# Patient Record
Sex: FEMALE | Race: WHITE | Employment: FULL TIME | URBAN - METROPOLITAN AREA
[De-identification: names, ages, dates, MRNs, and addresses within clinical notes are randomized per-mention and may not be internally consistent; named-entity substitution may affect disease eponyms.]

---

## 2017-11-22 ENCOUNTER — TELEPHONE (OUTPATIENT)
Dept: ORTHOPEDIC SURGERY | Age: 52
End: 2017-11-22

## 2017-11-27 NOTE — TELEPHONE ENCOUNTER
HOSPITAL CALLED INQUIRING IF A MRI COULD BE ORDERED BEFORE PATIENT SEEN ON 11/28/17. INFORMED THAT DUE TO AMOUNT OF TIME SINCE PATIENT WAS LAST SEEN , THIS COULD NOT BE DONE DUE TO INSURANCE GUIDELINES. OFFERED TO TAKE NUMBER AND HAVE PSS RETURN CALL.  HOSPITAL DECLINED

## 2017-11-28 ENCOUNTER — OFFICE VISIT (OUTPATIENT)
Dept: ORTHOPEDIC SURGERY | Age: 52
End: 2017-11-28

## 2017-11-28 VITALS
SYSTOLIC BLOOD PRESSURE: 118 MMHG | BODY MASS INDEX: 22.88 KG/M2 | WEIGHT: 134.04 LBS | HEIGHT: 64 IN | HEART RATE: 74 BPM | DIASTOLIC BLOOD PRESSURE: 76 MMHG

## 2017-11-28 DIAGNOSIS — M25.562 LEFT KNEE PAIN, UNSPECIFIED CHRONICITY: Primary | ICD-10-CM

## 2017-11-28 DIAGNOSIS — S80.02XA CONTUSION OF LEFT PATELLA, INITIAL ENCOUNTER: ICD-10-CM

## 2017-11-28 PROCEDURE — 73562 X-RAY EXAM OF KNEE 3: CPT | Performed by: ORTHOPAEDIC SURGERY

## 2017-11-28 PROCEDURE — MISC250 COMPRESSION STOCKING: Performed by: ORTHOPAEDIC SURGERY

## 2017-11-28 PROCEDURE — 99203 OFFICE O/P NEW LOW 30 MIN: CPT | Performed by: ORTHOPAEDIC SURGERY

## 2017-11-28 PROCEDURE — L1830 KO IMMOB CANVAS LONG PRE OTS: HCPCS | Performed by: ORTHOPAEDIC SURGERY

## 2017-11-28 RX ORDER — OXYCODONE HYDROCHLORIDE AND ACETAMINOPHEN 5; 325 MG/1; MG/1
1-2 TABLET ORAL EVERY 4 HOURS PRN
Qty: 20 TABLET | Refills: 0 | Status: SHIPPED | OUTPATIENT
Start: 2017-11-28 | End: 2018-11-28

## 2017-11-28 NOTE — LETTER
November 28, 2017        Re: Irene Guevara    Seen today for left knee injury. MRI pending. Will follow up once MRI is approved. 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         Board Certified Orthopaedic Surgeon  President and [address] [place] [address]

## 2017-11-28 NOTE — PROGRESS NOTES
Chief Complaint  Left Knee Pain    History  Patient is being seen after sustaining a knee giving way episode approximately 2 weeks ago. Patient was carrying a heavy box, approximately 100#, when her knee buckled a gave way. Patient does not recall a pop or crack, but noticed sever pain, anterior about the left knee. Patient presents today with use of crutches and limited weightbearing. Patient describes the patient as both sharp and throbbing pain that has not improved during this time. Patient notices elevated pain with any sort of bending or twisting motion. Of note, patient has an extensive surgical history of the right knee, having undergone PLM, Meniscus allograft and ultimately a partial TITA replacement was done. Patient states the pain she has today is worse than anything she had on the right. Pain Assessment  Location of Pain: Knee  Location Modifiers: Left  Severity of Pain: 10  Quality of Pain: Sharp, Aching, Throbbing  Duration of Pain: Persistent  Frequency of Pain: Constant  Aggravating Factors: Bending, Other (Comment) (twisting, and to wiggle her toes)  Limiting Behavior: Yes  Relieving Factors: Rest, Ice, Other (Comment) (medications)  Result of Injury: No  Work-Related Injury: No  Are there other pain locations you wish to document?: No     Physical Exam ______  Constitutional:  oriented to person, place, and time,  appears well-developed and well-nourished. ________________________________  Musculoskeletal:        Right hip: exhibits normal range of motion, normal strength, no tenderness, no bony tenderness, no swelling, no crepitus and no deformity. Left hip:  exhibits normal range of motion, normal strength, no tenderness, no swelling, no crepitus and no deformity. Right ankle: exhibits normal range of motion, no swelling, no ecchymosis and no deformity. No tenderness. Left ankle:  exhibits normal range of motion, no swelling, no ecchymosis and no deformity.  No tenderness. ________  Neurological:  is alert and oriented to person, place, and time. She has normal strength and normal reflexes. She displays a negative Romberg sign. Gait normal. ____  Skin: No ecchymosis, no laceration, no lesion and no rash noted. No erythema. ____  Psychiatric:  has a normal mood and affect. Her speech is normal. Cognition and memory are normal. __    Knee Examination:      RIGHT     LEFT  General:   EFFUSION:     [x] NL(4) [] Mild(3) [] Mod(2) [] Sev(1)   [x] NL(4) [] Mild(3) [] Mod(2) [] Sev(1)    TOTAL FLEX:  [x] NL(4) [] Mild(3) [] Mod(2) [] Sev(1)   [x] NL(4) [] Mild(3) [] Mod(2) [] Sev(1)    LACK of EXT:  [x] NL(4) [] Mild(3) [] Mod(2) [] Sev(1)   [x] NL(4) [] Mild(3) [] Mod(2) [] Sev(1)    QUAD WEAK: [x] NL(4) [] Mild(3) [] Mod(2) [] Sev(1)   [x] NL(4) [] Mild(3) [] Mod(2) [] Sev(1)    Points (16)            R:       L:        Tibio Femoral:       RIGHT     LEFT  JT LINE PAIN:     [x] NL(4) [] Mild(3) [] Mod(2) [] Sev(1)   [x] NL(4) [] Mild(3) [] Mod(2) [] Sev(1)    CREPITUS:  [x] NL(4) [] Mild(3) [] Mod(2) [] Sev(1)   [x] NL(4) [] Mild(3) [] Mod(2) [] Sev(1)    COMP PAIN:  [x] NL(4) [] Mild(3) [] Mod(2) [] Sev(1)   [x] NL(4) [] Mild(3) [] Mod(2) [] Sev(1)    Points (12)           R:      L:    Patello Femoral Joint:          RIGHT                  LEFT  CREPITUS:     [x] NL(4) [] Mild(3) [] Mod(2) [] Sev(1)   [x] NL(4) [] Mild(3) [] Mod(2) [] Sev(1)    COMP PAIN:  [x] NL(4) [] Mild(3) [] Mod(2) [] Sev(1)   [] NL(4) [] Mild(3) [] Mod(2) [x] Sev(1)    SOFT TISSUE PAIN:  [x] NL(4) []Mild(3) []Mod(2) [] Sev(1)  Location:    [x] NL(4) [] Mild(3) [] Mod(2) [] Sev(1)   Location:    SOFT TISSUE SWELLING:   [x]NL(4) []Mild(3) []Mod(2) [] Sev(1)   Location:     [x] NL(4) [] Mild(3) [] Mod(2) [] Sev(1)  Location:     Points: (16)          R:      L:         Patello Femoral Alignment:       RIGHT     LEFT  LAT.  SUBLUX at 20 Degrees (%width)   [x]0-25 (4) []26-50 (3) []51-75 (2) [] >75 (1) [x]0-25 (4) []26-50 (3) []51-75 (2) [] >75 (1)   MED. SUBLUX at 20 Degrees (mm)   [x]15 (4) []11-15 (3) []6-10 (2) [] 0-5 (1)    [x]15 (4) []11-15 (3) []6-10 (2) [] 0-5 (1)   Q Angle at 5 Degrees  [x]0-15 (4) []16-20 (3) []21-25 (2) [] >25 (1)   [x]0-15 (4) []16-20 (3) []21-25 (2) [] >25 (1)   Q Angle Max at 20 Degrees  [x]25 (4) []30 (3) []35 (2) [] >35 (1)   [x]25 (4) []30 (3) []35 (2) [] >35 (1)   Points: (16)           R:      L:    Ligament Test:   Test   Right Left  Difference Test  Right Left Difference   Ant 25 Degrees [x] Normal       mm      mm      mm Med 0 Degrees [x] Normal       mm      mm      mm   Ant 90 Degrees [x] Normal       mm      mm      mm Med 25 Degrees [x] Normal       mm      mm      mm   P.S. (0-3) [x] Normal         Lat 0 Degrees [x] Normal       mm      mm      mm   Post 25 Degrees [x] Normal       mm      mm       mm Lat 25 Degrees [x] Normal       mm      mm      mm   Post 90 Degrees [x] Normal       mm      mm      mm ER 25 Degrees [x] Normal       deg.      deg.      deg.    RPS (0-3) [x] Normal     ER 90 Degrees [x] Normal       deg.       deg.      deg. X-ray: 3 views. PA, Lateral and merchant views of the left. Possible fracture to the distal 1/3 of patella, however does not appear to be a true fracture of the patella   Right      Left:   Med Tibiofemoral:  [x] NL(4) []Mild(3) [] Mod (2) [] Sev (1)     [x] NL(4) []Mild(3) [] Mod (2) [] Sev (1)    Lat Tibiofemoral:  [x] NL(4) [] Mild(3) [] Mod(2) [] Sev(1)     [x] NL(4) [] Mild(3) [] Mod(2) [] Sev(1)    Patellofemoral:  [x] NL(4) [] Mild(3) [] Mod(2) [] Sev(1)  [x] NL(4) [] Mild(3) [] Mod(2) [] Sev(1)    Alignment:  [x] Normal          Degrees                WBL  [x] Normal                 Degrees                WBL   Point: (12)             R:      L:           Diagnosis:Patellar contusion    Treatment: Patient does not appear to have caused any significant trauma based on history, radiographs and examination. However, due to significant levels of pain exhibited by the patient, we will treat it as a fracture and have her sent for an MRI to rule out the presence of one or any other abnormality. We are also going to provide the patient with compression sleeves and a knee immobilizer and should continue to use her crutches and limited WB in the instance it is a fracture. We will follow up with the patient was the MRI is completed. Patient was in agreement with this. All questions answered. I Viky Egan, MS, ATC, am scribing for and in the presence of Dr. Mian Balderrama   This dictation was performed with a verbal recognition program [facility]) and it was checked for errors. It is possible that there are still dictated errors within this office note. If so, please bring any errors to my attention for an addendum. All efforts were made to ensure that this office note is accurate. Supervising Physician Attestation:  I, Dr. Ijeoma Mariano, personally performed the services described in this documentation as scribed above, and it is both accurate and complete and I agree with all pertinent clinical information. I personally interviewed the patient and performed a physical examination and medical decision making. I discussed the patient's condition and treatment options and have  reviewed and agree with the past medical, family and social history unless otherwise noted. All of the patient's questions were answered.       Board Certified Orthopaedic Surgeon  [address] [facility] and [address] [place] [address]  President and Medical Director  Samantha Joseph                12:33 PM

## 2017-11-30 ENCOUNTER — OFFICE VISIT (OUTPATIENT)
Dept: ORTHOPEDIC SURGERY | Age: 52
End: 2017-11-30

## 2017-11-30 VITALS
DIASTOLIC BLOOD PRESSURE: 81 MMHG | SYSTOLIC BLOOD PRESSURE: 112 MMHG | HEART RATE: 62 BPM | WEIGHT: 134 LBS | BODY MASS INDEX: 22.88 KG/M2 | HEIGHT: 64 IN

## 2017-11-30 DIAGNOSIS — S83.242D ACUTE MEDIAL MENISCUS TEAR OF LEFT KNEE, SUBSEQUENT ENCOUNTER: ICD-10-CM

## 2017-11-30 DIAGNOSIS — S83.512S NEW ACL TEAR, LEFT, SEQUELA: Primary | ICD-10-CM

## 2017-11-30 PROCEDURE — 99214 OFFICE O/P EST MOD 30 MIN: CPT | Performed by: ORTHOPAEDIC SURGERY

## 2017-11-30 NOTE — PROGRESS NOTES
Chief Complaint    Follow-up (left knee; MRI results)      History of Present Illness:  Pat Bain is a 50 y.o. female  Pain Assessment  Location of Pain: Knee  Location Modifiers: Left  Severity of Pain: 10  Quality of Pain: Sharp, Aching  Duration of Pain:  (weeks)  Aggravating Factors:  (moving)  Relieving Factors: Ice, Rest, Nsaids  Result of Injury: Yes  Work-Related Injury: No  Are there other pain locations you wish to document?: No     Patient is being seen for a follow up visit to discuss the results of her recent MRI for her right knee. Patient was at work when, carrying a very heavy box (approximately 100#) and experienced a giving way episode and onset of pain. Patient was diagnosed with a patellar contusion after radiographs couldn't confirm a fracture. However, she was sent for an MRI due to the amount of pain she was experiencing. Patient still has significant levels of pain with ranging her knee. When she went for her MRI, she came out of her immobilizer and stood on her left leg to get onto the MRI table and felt her knee was extremely unstable and almost fell, causing significant pain. Patient also expresses a burning sensation deep to her patella. Reviewing patient's history patient does recall hearing a pop about her knee. Medical History:  Patient's medications, allergies, past medical, surgical, social and family histories were reviewed and updated as appropriate. Allergies   Allergen Reactions    Sulfa Antibiotics Nausea And Vomiting     Current Outpatient Prescriptions on File Prior to Visit   Medication Sig Dispense Refill    oxyCODONE-acetaminophen (PERCOCET) 5-325 MG per tablet Take 1-2 tablets by mouth every 4 hours as needed for Pain .  20 tablet 0    b complex vitamins capsule Take 1 capsule by mouth daily      butalbital-acetaminophen-caffeine (FIORICET, ESGIC) -40 MG per tablet Take 1 tablet by mouth every 4 hours as needed for Headaches      Topiramate (TOPAMAX PO) Take 50 mg by mouth 3 times daily      ibuprofen (ADVIL;MOTRIN) 600 MG tablet Take 1 tablet by mouth every 6 hours as needed for Pain 30 tablet 0    ondansetron (ZOFRAN ODT) 4 MG disintegrating tablet Take 1 tablet by mouth every 8 hours as needed for Nausea 20 tablet 0    FLUoxetine (PROZAC) 40 MG capsule Take 40 mg by mouth daily.  cetirizine (ZYRTEC) 10 MG tablet Take 10 mg by mouth daily.  multivitamin (THERAGRAN) per tablet Take 1 tablet by mouth daily. No current facility-administered medications on file prior to visit. Review of Systems:  Relevant review of systems reviewed and available in the patient's chart    Vital Signs:  Vitals:    11/30/17 1505   BP: 112/81   Pulse: 62         Physical Exam ______  Constitutional:  oriented to person, place, and time,  appears well-developed and well-nourished. ________________________________  Musculoskeletal:        Right hip: exhibits normal range of motion, normal strength, no tenderness, no bony tenderness, no swelling, no crepitus and no deformity. Left hip:  exhibits normal range of motion, normal strength, no tenderness, no swelling, no crepitus and no deformity. Right ankle: exhibits normal range of motion, no swelling, no ecchymosis and no deformity. No tenderness. Left ankle:  exhibits normal range of motion, no swelling, no ecchymosis and no deformity. No tenderness. ________  Neurological:  is alert and oriented to person, place, and time. She has normal strength and normal reflexes. She displays a negative Romberg sign. Gait normal. ____  Skin: No ecchymosis, no laceration, no lesion and no rash noted. No erythema. ____  Psychiatric:  has a normal mood and affect.  Her speech is normal. Cognition and memory are normal. __    Knee Examination: Left Knee    Inspection:  Mild joint effusion, no obvious deformity    Palpation:  Moderate tenderness MJL, moderate-sever tenderness anterior    Range

## 2017-12-01 RX ORDER — OXYCODONE HYDROCHLORIDE AND ACETAMINOPHEN 5; 325 MG/1; MG/1
1-2 TABLET ORAL EVERY 4 HOURS PRN
Qty: 40 TABLET | Refills: 0 | Status: SHIPPED | OUTPATIENT
Start: 2017-12-01 | End: 2018-01-11

## 2017-12-06 ENCOUNTER — HOSPITAL ENCOUNTER (OUTPATIENT)
Dept: PHYSICAL THERAPY | Age: 52
Discharge: HOME | End: 2017-12-31
Admitting: ORTHOPAEDIC SURGERY

## 2017-12-06 DIAGNOSIS — S83.512S SPRAIN OF ANTERIOR CRUCIATE LIGAMENT OF LEFT KNEE, SEQUELA: Primary | ICD-10-CM

## 2017-12-06 NOTE — PLAN OF CARE
consistent with pathology which may benefit from Dry needling      []other:      Prognosis/Rehab Potential:      []Excellent   [x]Good    []Fair   []Poor    Tolerance of evaluation/treatment:    []Excellent   [x]Good    []Fair   []Poor    Physical Therapy Evaluation Complexity Justification  [x] A history of present problem with:  [] no personal factors and/or comorbidities that impact the plan of care;  [x]1-2 personal factors and/or comorbidities that impact the plan of care  []3 personal factors and/or comorbidities that impact the plan of care  [x] An examination of body systems using standardized tests and measures addressing any of the following: body structures and functions (impairments), activity limitations, and/or participation restrictions;:  [] a total of 1-2 or more elements   [] a total of 3 or more elements   [x] a total of 4 or more elements   [x] A clinical presentation with:  [] stable and/or uncomplicated characteristics   [x] evolving clinical presentation with changing characteristics  [] unstable and unpredictable characteristics;   [x] Clinical decision making of [] low, [x] moderate, [] high complexity using standardized patient assessment instrument and/or measurable assessment of functional outcome. [] EVAL (LOW) 70666 (typically 20 minutes face-to-face)  [x] EVAL (MOD) 36709 (typically 30 minutes face-to-face)  [] EVAL (HIGH) 27105 (typically 45 minutes face-to-face)  [] RE-EVAL       PLAN  Frequency/Duration:  1 day per week until surgery and then 2 days per week for 12 Weeks:  Interventions:  [x]  Therapeutic exercise including: strength training, ROM, for Lower extremity and core   [x]  NMR activation and proprioception for LE, Glutes and Core   [x]  Manual therapy as indicated for LE, Hip and spine to include: Dry Needling/IASTM, STM, PROM, Gr I-IV mobilizations, manipulation.    [x] Modalities as needed that may include: thermal agents, E-stim, Biofeedback, US, iontophoresis as indicated  [x] Patient education on joint protection, postural re-education, activity modification, progression of HEP. HEP instruction: Patient instructed on HEP on this date with handout provided and all questions answered. Patient was instructed to contact PT with any complications or questions about HEP moving forward. Patient verbally stated she understood. GOALS:  Patient stated goal: Return to walking 1 mile at [employer]    Therapist goals for Patient:   Short Term Goals: To be achieved in: 4 weeks  1. Independent in HEP and progression per patient tolerance, in order to prevent re-injury. 2. Patient will have a decrease in pain to <2/10 at max to facilitate improvement in movement, function, and ADLs as indicated by Functional Deficits. 3. Patient will maintain L Knee AROM 0-135 prior to surgery. Long Term Goals: To be achieved in: 12 weeks post-operatively  1. Disability index score of 40% or less for the LEFS to assist with reaching prior level of function. 2. Patient will demonstrate increased AROM to 0-135 to allow for proper joint functioning as indicated by patients Functional Deficits. 3. Patient will demonstrate an increase in Strength to 4/5 or greater in BLE to allow for proper functional mobility as indicated by patients Functional Deficits. 4. Patient will be able to walk 1 mile without increase in pain or swelling. (patient specific functional goal)  5.  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

## 2017-12-06 NOTE — FLOWSHEET NOTE
[facility]  Orthopaedics and Sports Rehabilitation, [facility]    Physical Therapy Daily Treatment Note  Date:  2017    Patient Name:  Fabian Gonzalez    :  1965  MRN: [id number]  Restrictions/Precautions:  None  Medical/Treatment Diagnosis Information:  · Diagnosis: Q44.659J (ICD-10-CM) - Sprain of anterior cruciate ligament of left knee, sequela  · Treatment Diagnosis: Decreased ROM, decreased quad activation, increased swelling, impaired gait mechanics  Insurance/Certification information:  PT Insurance Information: /PT BENEFITS 2017/FACILITY/ [employer]/ EFFECTIVE/ ACTIVE 16/ DED 5000/OOP 6350/ COPAY 0/ PAYS 80%/ 20 [facility]Y/ REF #[id number]/ 17 CB/ COLLECT 50 FOR EVAL AND 25 FOR FOLLOW UP VISITS  Physician Information:  Referring Practitioner: Soraida Brown of care signed (Y/N): Pending    Date of Patient follow up with Physician: 17    G-Code (if applicable):      Date G-Code Applied:  17  PT G-Codes  Functional Assessment Tool Used: LEFS  Score: 97% deficit  Functional Limitation: Mobility: Walking and moving around  Mobility: Walking and Moving Around Current Status (): At least 80 percent but less than 100 percent impaired, limited or restricted  Mobility: Walking and Moving Around Goal Status ():  At least 1 percent but less than 20 percent impaired, limited or restricted    Progress Note: [x]  Yes  []  No  Next due by: Visit #10       Latex Allergy:  [x]NO      []YES  Preferred Language for Healthcare:   [x]English       []other:    Visit # Insurance Allowable   1 20 VPCY     Pain level:  0/10 rest, 9/10 worst     SUBJECTIVE:  See eval    Imaging Impression (via MD note):   Left ACL rupture  Acute, red-white tear of the medial meniscus  Bony contusion of tibial plateau   Mild MCL sprain  Neuritis    OBJECTIVE:       ROM PROM AROM Overpressure Comment     L R L R L R     Flexion     135 142         Extension     +5 +5                                                 Strength L R Comment   Quad 3+/5 5/5 Quad Set   Hamstring         Gastroc         Hip  flexion         Hip abd                                   Special Test Results/Comment   Meniscal Click Neg   Crepitus Neg   Flexion Test Neg   Valgus Laxity Mild on L   Varus Laxity Neg   Lachmans Pos on R   Drop Back Neg   Homans Neg               Girth L R   Mid Patella 34.4 34.5   Suprapatellar 34.4 34.5   5cm above 35.8 35.3   15cm above          Reflexes/Sensation:               [x]Dermatomes/Myotomes intact               [x]Reflexes equal and normal bilaterally              []Other:     Joint mobility:                [x]Normal               []Hypo              []Hyper     Palpation: Has tenderness over aspen-lateral joint line (moderate); denies any other tenderness to palpation     Functional Mobility/Transfers: Requires increased time to raise/lower L LE onto table     Posture: Guarded posture in long sitting with visible muscle tremors at times during evaluation.     Bandages/Dressings/Incisions: n/a     Gait: (include devices/WB status) Ambulates without weight on LLE with knee in KI using bilateral axillary crutches     Orthopedic Special Tests: see above          RESTRICTIONS/PRECAUTIONS: L Knee ACL Tear + Medial Meniscus Tear + MCL Sprain    Exercises/Interventions:   Exercise/Equipment Resistance/Repetitions Other comments   Stretching     Hamstring 3 x 30\"    Towel Pull 3 x 30\"    Inclined Calf     Hip Flexion     ITB     Groin     Quad                    SLR     Supine 3 x 10    Abduction     Adduction     Prone     SLR+          Isometrics     Quad sets 10 x 10\"         Patellar Glides     Medial     Superior     Inferior          ROM     Sheet Pulls Demo for HEP    Hang Weights     Passive 5 x 30\" EOB Self-assist   Active     Weight Shift     Ankle Pumps                    CKC     Calf raises     Wall sits     Step ups     1 leg stand     Squatting     CC TKE     Balance     bridges          PRE Extension  RANGE:   Flexion  RANGE:        Quantum machines     Leg press      Leg extension     Leg curl          Manual interventions                 Patient Education:   12/6/17: Patient educated about PT diagnosis, prognosis, and plan of care; educated on role of physical therapy; educated on HEP; educated on anatomy of ACL and mensicus; on activities to avoid; on importance of maintenance of ROM pre-operatively. HEP:Patient instructed on HEP on this date with handout provided and all questions answered. Patient was instructed to contact PT with any complications or questions about HEP moving forward. Patient verbally stated she understood. Updated 12/6/17    Therapeutic Exercise and NMR EXR  [x] (99276) Provided verbal/tactile cueing for activities related to strengthening, flexibility, endurance, ROM for improvements in LE, proximal hip, and core control with self care, mobility, lifting, ambulation.  [] (39335) Provided verbal/tactile cueing for activities related to improving balance, coordination, kinesthetic sense, posture, motor skill, proprioception  to assist with LE, proximal hip, and core control in self care, mobility, lifting, ambulation and eccentric single leg control.      NMR and Therapeutic Activities:    [x] (10217 or 41487) Provided verbal/tactile cueing for activities related to improving balance, coordination, kinesthetic sense, posture, motor skill, proprioception and motor activation to allow for proper function of core, proximal hip and LE with self care and ADLs  [] (07311) Gait Re-education- Provided training and instruction to the patient for proper LE, core and proximal hip recruitment and positioning and eccentric body weight control with ambulation re-education including up and down stairs     Home Exercise Program:    [x] (87993) Reviewed/Progressed HEP activities related to strengthening, flexibility, endurance, ROM of core, proximal hip and LE for functional self-care, proper joint functioning as indicated by patients Functional Deficits. 3. Patient will demonstrate an increase in Strength to 4/5 or greater in BLE to allow for proper functional mobility as indicated by patients Functional Deficits. 4. Patient will be able to walk 1 mile without increase in pain or swelling. (patient specific functional goal)  5. 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 [] Progression is slowed due to complexities listed. [] Progression has been slowed due to co-morbidities.   [x] Plan just implemented, too soon to assess goals progression  [] Other:     ASSESSMENT:  See eval    Treatment/Activity Tolerance:  [x] Patient tolerated treatment well [] Patient limited by fatique  [] Patient limited by pain  [] Patient limited by other medical complications  [] Other:     Prognosis: [x] Good [] Fair  [] Poor    Patient Requires Follow-up: [x] Yes  [] No    PLAN: See eval  [] Continue per plan of care [] Alter current plan (see comments)  [x] Plan of care initiated [] Hold pending MD visit [] Discharge    Electronically signed by: Asher Concepcion PT, DPT

## 2017-12-14 ENCOUNTER — OFFICE VISIT (OUTPATIENT)
Dept: ORTHOPEDIC SURGERY | Age: 52
End: 2017-12-14

## 2017-12-14 ENCOUNTER — HOSPITAL ENCOUNTER (OUTPATIENT)
Dept: PHYSICAL THERAPY | Age: 52
Discharge: HOME | End: 2017-12-14
Admitting: ORTHOPAEDIC SURGERY

## 2017-12-14 VITALS
HEART RATE: 68 BPM | HEIGHT: 64 IN | SYSTOLIC BLOOD PRESSURE: 114 MMHG | BODY MASS INDEX: 22.88 KG/M2 | DIASTOLIC BLOOD PRESSURE: 81 MMHG | WEIGHT: 134.04 LBS

## 2017-12-14 DIAGNOSIS — S83.512A NEW ACL TEAR, LEFT, INITIAL ENCOUNTER: Primary | ICD-10-CM

## 2017-12-14 PROCEDURE — 99213 OFFICE O/P EST LOW 20 MIN: CPT | Performed by: ORTHOPAEDIC SURGERY

## 2017-12-14 RX ORDER — OXYCODONE HYDROCHLORIDE AND ACETAMINOPHEN 5; 325 MG/1; MG/1
1-2 TABLET ORAL EVERY 6 HOURS PRN
Qty: 40 TABLET | Refills: 0 | Status: SHIPPED | OUTPATIENT
Start: 2017-12-14 | End: 2018-01-11

## 2017-12-14 NOTE — PROGRESS NOTES
(PERCOCET) 5-325 MG per tablet Take 1-2 tablets by mouth every 4 hours as needed for Pain  1-2 tablets every 3-4 hours when necessary pain. 40 tablet 0    oxyCODONE-acetaminophen (PERCOCET) 5-325 MG per tablet Take 1-2 tablets by mouth every 4 hours as needed for Pain . 20 tablet 0    b complex vitamins capsule Take 1 capsule by mouth daily      butalbital-acetaminophen-caffeine (FIORICET, ESGIC) -40 MG per tablet Take 1 tablet by mouth every 4 hours as needed for Headaches      Topiramate (TOPAMAX PO) Take 50 mg by mouth 3 times daily      ibuprofen (ADVIL;MOTRIN) 600 MG tablet Take 1 tablet by mouth every 6 hours as needed for Pain 30 tablet 0    ondansetron (ZOFRAN ODT) 4 MG disintegrating tablet Take 1 tablet by mouth every 8 hours as needed for Nausea 20 tablet 0    FLUoxetine (PROZAC) 40 MG capsule Take 40 mg by mouth daily.  cetirizine (ZYRTEC) 10 MG tablet Take 10 mg by mouth daily.  multivitamin (THERAGRAN) per tablet Take 1 tablet by mouth daily. No current facility-administered medications for this visit. Allergies   Allergen Reactions    Sulfa Antibiotics Nausea And Vomiting       Review of Systems:  A 14 point review of systems was completed by the patient on 12/14/2017 and is available in the media section of the scanned medical record and was reviewed on 12/14/2017. The review is negative with the exception of those things mentioned in the HPI and Past Medical History     Vital Signs:   /81   Pulse 68   Ht 5' 4.02\" (1.626 m)   Wt 134 lb 0.6 oz (60.8 kg)   BMI 23.00 kg/m²     General Exam:   Mental Status: The patient is oriented to time, place and person. The patient's mood and affect are appropriate. Neurological: The patient has good coordination. There is no weakness or sensory deficit. Knee Examination:left    Skin:  There are no skin lesions, cellulitis, or extreme edema in the lower extremities.  Sensation is grossly intact to light touch discussion of ACL surgery and possible necessity to perform added surgery based on the operative findings and surgeons judgement including meniscus surgery or repair, cartilage restoration procedures, patella procedures and other ligament procedures. The knee ACL description injury information sheet was provided including cartilage and meniscus injury, success rates, complications, rehab, failure rates, graft choices, PO graft site harvest pain or scar, and PO expectations. The RBA explained including but not limited to pain, swelling, infection, blood clots, atrophy, scar tissue, fibrosis, preexisting damage aggravation, portal pain or scar, bleeding, and need for patient compliance with extensive rehab program. Further info sites mentioned included the Website and Ebook. All questions answered, informed consent obtained. PO instruction sheet reviewed including skin prep and DVT program.    Follow up in: pre surgical discussion. 11:40 AM      Adam Bobby PA-C  Orthopaedic Sports Medicine Physician Assistant    During this examination, IAdam PA-C, functioned as a scribe for Dr. Barbie Guy. This dictation was performed with a verbal recognition program (DRAGON) and it was checked for errors. It is possible that there are still dictated errors within this office note. If so, please bring any errors to my attention for an addendum. All efforts were made to ensure that this office note is accurate. Supervising Physician Attestation:  I, Dr. Barbie Guy, personally performed the services described in this documentation as scribed above, and it is both accurate and complete and I agree with all pertinent clinical information. I personally interviewed the patient and performed a physical examination and medical decision making. I discussed the patient's condition and treatment options and have  reviewed and agree with the past medical, family and social history unless otherwise noted.  All

## 2017-12-14 NOTE — FLOWSHEET NOTE
Flexion Test Neg   Valgus Laxity Mild on L   Varus Laxity Neg   Lachmans Pos on R   Drop Back Neg   Homans Neg               Girth L R   Mid Patella 34.4 34.5   Suprapatellar 34.4 34.5   5cm above 35.8 35.3   15cm above          Reflexes/Sensation:               [x]Dermatomes/Myotomes intact               [x]Reflexes equal and normal bilaterally              []Other:     Joint mobility:                [x]Normal               []Hypo              []Hyper     Palpation: Has tenderness over aspen-lateral joint line (moderate); denies any other tenderness to palpation     Functional Mobility/Transfers: Requires increased time to raise/lower L LE onto table     Posture: Guarded posture in long sitting with visible muscle tremors at times during evaluation.     Bandages/Dressings/Incisions: n/a     Gait: (include devices/WB status) Ambulates without weight on LLE with knee in KI using bilateral axillary crutches     Orthopedic Special Tests: see above          RESTRICTIONS/PRECAUTIONS: L Knee ACL Tear + Medial Meniscus Tear + MCL Sprain    Exercises/Interventions:   Exercise/Equipment Resistance/Repetitions Other comments   Stretching     Hamstring 5 x 30\" Prop   Towel Pull 5 x 30\" Prop   Inclined Calf     Hip Flexion     ITB     Groin     Quad                    SLR     Supine 3 x 10 2# above knee   Abduction 3 x 10 2# Above Knee   Adduction     Prone 3 x 10 2# above knee   SLR+          Isometrics     Quad sets 10 x 10\"         Patellar Glides     Medial     Superior     Inferior          ROM     Sheet Pulls 5 x 30\"    Hang Weights     Active     Weight Shift 10 x 10\" Biodex %WB: up to 75%   Ankle Pumps                    CKC     Calf raises     Wall sits 3 x fatigue    Step ups     1 leg stand     Squatting     CC TKE     Balance     bridges          PRE     Extension  RANGE:   Flexion  RANGE:        Quantum machines     Leg press      Leg extension     Leg curl          Therapeutic Activity     Gait Training 5' [employer] walking, bilat crutches             Manual interventions                 Patient Education:   12/6/17: Patient educated about PT diagnosis, prognosis, and plan of care; educated on role of physical therapy; educated on HEP; educated on anatomy of ACL and mensicus; on activities to avoid; on importance of maintenance of ROM pre-operatively. HEP:Patient instructed on HEP on this date with handout provided and all questions answered. Patient was instructed to contact PT with any complications or questions about HEP moving forward. Patient verbally stated she understood. Updated 12/14/17    Therapeutic Exercise and NMR EXR  [x] (29401) Provided verbal/tactile cueing for activities related to strengthening, flexibility, endurance, ROM for improvements in LE, proximal hip, and core control with self care, mobility, lifting, ambulation.  [] (73675) Provided verbal/tactile cueing for activities related to improving balance, coordination, kinesthetic sense, posture, motor skill, proprioception  to assist with LE, proximal hip, and core control in self care, mobility, lifting, ambulation and eccentric single leg control.      NMR and Therapeutic Activities:    [x] (01523 or 09156) Provided verbal/tactile cueing for activities related to improving balance, coordination, kinesthetic sense, posture, motor skill, proprioception and motor activation to allow for proper function of core, proximal hip and LE with self care and ADLs  [] (93639) Gait Re-education- Provided training and instruction to the patient for proper LE, core and proximal hip recruitment and positioning and eccentric body weight control with ambulation re-education including up and down stairs     Home Exercise Program:    [x] (74405) Reviewed/Progressed HEP activities related to strengthening, flexibility, endurance, ROM of core, proximal hip and LE for functional self-care, mobility, lifting and ambulation/stair navigation   [] (22327)Reviewed/Progressed HEP activities related to improving balance, coordination, kinesthetic sense, posture, motor skill, proprioception of core, proximal hip and LE for self care, mobility, lifting, and ambulation/stair navigation      Manual Treatments:  PROM / STM / Oscillations-Mobs:  G-I, II, III, IV (PA's, Inf., Post.)  [] (63240) Provided manual therapy to mobilize LE, proximal hip and/or LS spine soft tissue/joints for the purpose of modulating pain, promoting relaxation,  increasing ROM, reducing/eliminating soft tissue swelling/inflammation/restriction, improving soft tissue extensibility and allowing for proper ROM for normal function with self care, mobility, lifting and ambulation. Modalities: Ice to go    Charges:  Timed Code Treatment Minutes: 60   Total Treatment Minutes: 60       [] EVAL (LOW) 92243 (typically 20 minutes face-to-face)  [] EVAL (MOD) 62888 (typically 30 minutes face-to-face)  [] EVAL (HIGH) 62766 (typically 45 minutes face-to-face)  [] RE-EVAL   [x] KN(30683) x  2   [] IONTO  [x] NMR (52066) x  1   [] VASO  [] Manual (46750) x       [] Other:  [x] TA x  1    [] Mech Traction (85625)  [] ES(attended) (69999)      [] ES (un) (51155):     GOALS:  Patient stated goal: Return to walking 1 mile at [employer] for fitness     Therapist goals for Patient:   Short Term Goals: To be achieved in: 4 weeks  1. Independent in HEP and progression per patient tolerance, in order to prevent re-injury. 2. Patient will have a decrease in pain to <2/10 at max to facilitate improvement in movement, function, and ADLs as indicated by Functional Deficits. 3. Patient will maintain L Knee AROM 0-135 prior to surgery.     Long Term Goals: To be achieved in: 12 weeks post-operatively  1. Disability index score of 40% or less for the LEFS to assist with reaching prior level of function. 2. Patient will demonstrate increased AROM to 0-135 to allow for proper joint functioning as indicated by patients Functional Deficits.    3. Patient will demonstrate an increase in Strength to 4/5 or greater in BLE to allow for proper functional mobility as indicated by patients Functional Deficits. 4. Patient will be able to walk 1 mile without increase in pain or swelling. (patient specific functional goal)  5. 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 [] Progression is slowed due to complexities listed. [] Progression has been slowed due to co-morbidities. [x] Plan just implemented, too soon to assess goals progression  [] Other:     ASSESSMENT:  See eval    Treatment/Activity Tolerance:  [x] Patient tolerated treatment well [] Patient limited by fatique  [] Patient limited by pain  [] Patient limited by other medical complications  [x] Other: Tolerated therapy well today. Has maintained her ROM since PT eval last week. Her quad continues to show good activation but does fatigue significantly quicker on her L leg than her R leg. PT demonstrated to patient how to perform wall sits safely at home to increase her quad strength prior to surgery in January. Patient also educated on proper gait with crutches and how to safely WB through her L LE without buckling in her knee. Patient to see PT 1x/week leading up to surgery to monitor ROM, gait, and strength. Patient agreeable to this plan.     Prognosis: [x] Good [] Fair  [] Poor    Patient Requires Follow-up: [x] Yes  [] No    PLAN: See eval  [] Continue per plan of care [] Alter current plan (see comments)  [x] Plan of care initiated [] Hold pending MD visit [] Discharge    Electronically signed by: Lara Craft PT, DPT

## 2017-12-20 DIAGNOSIS — S83.512A SPRAIN OF ANTERIOR CRUCIATE LIGAMENT OF LEFT KNEE, INITIAL ENCOUNTER: Primary | ICD-10-CM

## 2017-12-22 ENCOUNTER — TELEPHONE (OUTPATIENT)
Dept: ORTHOPEDIC SURGERY | Age: 52
End: 2017-12-22

## 2018-01-01 ENCOUNTER — HOSPITAL ENCOUNTER (OUTPATIENT)
Dept: PHYSICAL THERAPY | Age: 53
Discharge: HOME | End: 2018-01-31
Attending: ORTHOPAEDIC SURGERY | Admitting: ORTHOPAEDIC SURGERY

## 2018-01-05 ENCOUNTER — TELEPHONE (OUTPATIENT)
Dept: ORTHOPEDIC SURGERY | Age: 53
End: 2018-01-05

## 2018-01-09 NOTE — PROGRESS NOTES
[facility] / [facility] ([facility]) Cheryl Bullard, [address]    Acknowledgment of Informed Consent for Surgical or Medical Procedure and Sedation  I agree to allow doctor(s) [address] [place] [address] and his/her associates or assistants, including residents and/or other qualified medical practitioner to perform the following medical treatment or procedure and to administer or direct the administration of sedation as necessary:  Procedure(s): LEFT KNEE ARTHROSCOPIC ANTERIOR CRUCIATE LIGAMENT RECONSTRUCTION SEMI TENDINOSIS GRACILIS VERSUS BONE TENDON BONE AUTOGRAFT MEDIAL [address] [facility]  My doctor has explained the following regarding the proposed procedure:   the explanation of the procedure   the benefits of the procedure   the potential problems that might occur during recuperation   the risks and side effects of the procedure which could include but are not limited to severe blood loss, infection, stroke or death   the benefits, risks and side effect of alternative procedures including the consequences of declining this procedure or any alternative procedures   the likelihood of achieving satisfactory results. I acknowledge no guarantee or assurance has been made to me regarding the results. I understand that during the course of this treatment/procedure, unforeseen conditions can occur which require an additional or different procedure. I agree to allow my physician or assistants to perform such extension of the original procedure as they may find necessary. I understand that sedation will often result in temporary impairment of memory and fine motor skills and that sedation can occasionally progress to a state of deep sedation or general anesthesia.     I understand the risks of anesthesia for surgery include, but are not limited to, sore throat, hoarseness, injury to face, mouth, or teeth; nausea; headache; injury to blood vessels or nerves; death, brain damage, or

## 2018-01-11 ENCOUNTER — HOSPITAL ENCOUNTER (OUTPATIENT)
Dept: PREADMISSION TESTING | Age: 53
Discharge: HOME | End: 2018-01-11
Attending: ORTHOPAEDIC SURGERY | Admitting: ORTHOPAEDIC SURGERY

## 2018-01-11 VITALS — HEIGHT: 64 IN | BODY MASS INDEX: 22.88 KG/M2 | WEIGHT: 134 LBS

## 2018-01-11 ASSESSMENT — PAIN - FUNCTIONAL ASSESSMENT: PAIN_FUNCTIONAL_ASSESSMENT: 0-10

## 2018-01-11 NOTE — PROGRESS NOTES
if you have not been preregistered yet. On the day of your procedure bring your insurance card and photo ID. You will be registered at your bedside once brought back to your room. 5. DO NOT EAT OR DRINK ANYTHING AFTER MIDNIGHT. 6. MEDICATIONS    Take the following medications with a SMALL sip of water:  topamax prozac    Use your usual dose of inhalers the morning of surgery. BRING your rescue inhaler with you to hospital.    Anesthesia does NOT want you to take insulin the morning of surgery. They will control your blood sugar while you are at the hospital. Please contact your ordering physician for instructions regarding your insulin the night before your procedure. If you have an insulin pump, please keep it set on basal rate. 7. Do not swallow water when brushing teeth. No gum, candy, mints or ice chips. Refrain from smoking or at least decrease the amount. 8. Dress in loose, comfortable clothing appropriate for redressing after your procedure. Do not wear jewelry (including body piercings), make-up (especially NO eye make-up), fingernail polish (NO toenail polish if foot/leg surgery), lotion, powders or metal hairclips. 9. Dentures, glasses, or contacts will need to be removed before your procedure. Bring cases for your glasses, contacts, dentures, or hearing aids to protect them while you are in surgery. 10. If you use a CPAP, please bring it with you on the day of your procedure. 11. We recommend that valuable personal  belongings, such as credit cards, cash, cell phones, e-tablets or jewelry, be left at home during your stay. The hospital will not be responsible for valuables that are not secured in the hospital safe. However, if your insurance requires a co-pay, you may want to bring a method of payment, i.e. Check or credit card, if you wish to pay your co-pay the day of surgery. 12. If you are to stay overnight, you may bring a bag with personal items.  Please have any large items you may need brought in by your family after your arrival to your hospital room. 15. If you have a Living Will or Durable Power of , please bring a copy on the day of your procedure. 15. With your permission, one family member may accompany you while you are being prepared for surgery. Once you are ready, additional family members may join you. HOW WE KEEP YOU SAFE and WORK TO PREVENT SURGICAL SITE INFECTIONS:  1. Health care workers should always check your ID bracelet to verify your name and birth date. You will be asked many times to state your name, date of birth, and allergies. 2. Health care workers should always clean their hands with soap or alcohol gel before providing care to you. It is okay to ask anyone if they cleaned their hands before they touch you. 3. You will be actively involved in verifying the type of procedure you are having and ensuring the correct surgical site. This will be confirmed multiple times prior to your procedure. Do NOT rosana your surgery site UNLESS instructed to by your surgeon. 4. Do not shave or wax for 72 hours prior to procedure near your operative site. Shaving with a razor can irritate your skin and make it easier to develop an infection. On the day of your procedure, any hair that needs to be removed near the surgical site will be clipped by a healthcare worker using a special clippers designed to avoid skin irritation. 5. When you are in the operating room, your surgical site will be cleansed with a special soap, and in most cases, you will be given an antibiotic before the surgery begins. AFTER YOUR PROCEDURE:  1. For comfort and safety, arrange to have someone at home with you for the first 24 hours after discharge. 2. You and your family will be given written instructions about your diet, activity, dressing care, medications, and return visits. 3. Always clean your hands before and after caring for your wound.  Do not let your family touch your surgery site without cleaning their hands. 4. Mild nausea, headache, muscle aches, sore throat, or fatigue may occur after anesthesia. Should any of these symptoms become severe, or should you notice any signs of infection, you should call your surgeon. 5. Narcotic pain medications can cause significant constipation. You may want to add a stool softener to your postoperative medication schedule or speak to your surgeon on how best to manage this SIDE EFFECT. SPECIAL INSTRUCTIONS  Thank you for allowing us to care for you. We strive to exceed your expectations in the delivery of care and service provided to you and your family. If you need to contact us for any reason, please call us at [phone]    Instructions reviewed with patient during preadmission testing phone interview. Lyn Barragan. 1/11/2018 .9:36 AM      ADDITIONAL EDUCATIONAL INFORMATION REVIEWED PER PHONE WITH YOU AND/OR YOUR FAMILY:  Yes Bring a urine sample on day of surgery  Yes Pain Goal-Taking Control of Your Pain  Yes FAQs about Surgical Site Infections  Yes Hibiclens® Bathing Instructions / or Other Antibacterial Soap  No Incentive Spirometer Education  No Other

## 2018-01-11 NOTE — PROGRESS NOTES
The following educational items and goals will be achieved upon completion of the patient's Pre-admission testing experience:             Identify the learner who is being assessed for education:  patient                    Ability to Learn:  Exhibits ability to grasp concepts and respond to questions: High  Ready to Learn: Yes  anxious   Preferred Method of Learning:  verbal  Barriers to Learning: Verbalizes interest  Special Considerations due to cultural, Samaritan, spiritual beliefs:  Yes  Language:  English  :  Yes    [address]   [x] Appropriate evaluation / integration of data as delineated by ASPAN Standards of Perianesthesia Nursing Practice    Pain scale and pain management   [x]Patient verbalizes understanding of pain scale and pain management  [x]Pre-operative determination of patients anticipated Post-Operative pain goal:   4 of 10 on 10 point scale post op goal  [] Other     Medication(s) - Compliance with preop medication instructions  [x] Patient verbalizes understanding of preop medications (see [facility] Presurgical Instructions)    Instructions, Pre op                                                                                            [x] Patient verbalizes understanding of presurgical instructions as reviewed with phone interview nurse or in-person nurse review    Fall Risk Potential, Preoperatively                                                                                   []No preoperative risk identified  []Preop risk identified:                    []Sensory deficit        []Motor deficit        []Balance problem        []Home medication        []Uses assistive device                    []History of a Fall within the last 30 days    Goal(s) for fall prevention:  []Prevent fall or injury by requesting assistance with activities of daily living  []Patient / Significant other verbalizes understanding the need to call for

## 2018-01-16 ENCOUNTER — OFFICE VISIT (OUTPATIENT)
Dept: ORTHOPEDIC SURGERY | Age: 53
End: 2018-01-16

## 2018-01-16 ENCOUNTER — TELEPHONE (OUTPATIENT)
Dept: ORTHOPEDIC SURGERY | Age: 53
End: 2018-01-16

## 2018-01-16 ENCOUNTER — HOSPITAL ENCOUNTER (OUTPATIENT)
Dept: PHYSICAL THERAPY | Age: 53
Discharge: HOME | End: 2018-01-16
Admitting: ORTHOPAEDIC SURGERY

## 2018-01-16 VITALS
HEIGHT: 64 IN | BODY MASS INDEX: 22.88 KG/M2 | DIASTOLIC BLOOD PRESSURE: 87 MMHG | WEIGHT: 134.04 LBS | SYSTOLIC BLOOD PRESSURE: 120 MMHG | HEART RATE: 66 BPM

## 2018-01-16 DIAGNOSIS — S83.207A TEARS OF MENISCUS AND ANTERIOR CRUCIATE LIGAMENT OF LEFT KNEE, INITIAL ENCOUNTER: Primary | ICD-10-CM

## 2018-01-16 DIAGNOSIS — S83.512A TEARS OF MENISCUS AND ANTERIOR CRUCIATE LIGAMENT OF LEFT KNEE, INITIAL ENCOUNTER: Primary | ICD-10-CM

## 2018-01-16 PROCEDURE — MISC250 COMPRESSION STOCKING: Performed by: ORTHOPAEDIC SURGERY

## 2018-01-16 PROCEDURE — L1832 KO ADJ JNT POS R SUP PRE CST: HCPCS | Performed by: ORTHOPAEDIC SURGERY

## 2018-01-16 PROCEDURE — E0114 CRUTCH UNDERARM PAIR NO WOOD: HCPCS | Performed by: ORTHOPAEDIC SURGERY

## 2018-01-16 PROCEDURE — PREOPEXAM PRE-OP EXAM: Performed by: ORTHOPAEDIC SURGERY

## 2018-01-16 RX ORDER — OXYCODONE HYDROCHLORIDE AND ACETAMINOPHEN 5; 325 MG/1; MG/1
1 TABLET ORAL EVERY 6 HOURS PRN
Qty: 40 TABLET | Refills: 0 | Status: SHIPPED | OUTPATIENT
Start: 2018-01-16 | End: 2018-01-23

## 2018-01-16 NOTE — FLOWSHEET NOTE
increased some in last few weeks as she has stopped taking NSAIDs prior to surgery. OBJECTIVE:      1/16/18  ROM PROM AROM Overpressure Comment     L R L R L R     Flexion     142 142         Extension     +2 +5                                                1/16/18  Strength L R Comment   Quad See Biodex See Biodex    Hamstring  See Biodex See Biodex    Gastroc         Hip  flexion         Hip abd                                1/16/18  Special Test Results/Comment   Meniscal Click Neg   Crepitus Neg   Flexion Test Neg   Valgus Laxity Mild on L   Varus Laxity Neg   Lachmans Pos on R   Drop Back Neg   Homans Neg   Temperature Update PO      1/16/18  Girth L R   Mid Patella 34.8 35.2   Suprapatellar 35.2 34.9   5cm above 36.8 37.7   15cm above 42.5 43.7      Reflexes/Sensation:               [x]Dermatomes/Myotomes intact               [x]Reflexes equal and normal bilaterally              []Other:     Joint mobility:                [x]Normal               []Hypo              []Hyper     Palpation: Has tenderness over aspen-lateral joint line (moderate); denies any other tenderness to palpation     Functional Mobility/Transfers: Requires increased time to raise/lower L LE onto table     Posture: Guarded posture in long sitting with visible muscle tremors at times during evaluation.     Bandages/Dressings/Incisions: Update PO     Gait: (include devices/WB status) ambulates with KI in place without AD 1/16/18     Orthopedic Special Tests: see above    Isometric Strength Testing Results Summary  Knee    On 1/16/2018 the patient, Pee Perkins, underwent an Isometric Strength Test to evaluate current status and progress of the strengthening phase of his/her current rehabilitation program.  She is currently being treated for Pre-op L Knee ACL reconstruction + Medial Meniscus repair vs. excision. Attached you will find a computer generated summary of the results which outlines the current status.       To activities related to strengthening, flexibility, endurance, ROM of core, proximal hip and LE for functional self-care, mobility, lifting and ambulation/stair navigation   [] (22297)Reviewed/Progressed HEP activities related to improving balance, coordination, kinesthetic sense, posture, motor skill, proprioception of core, proximal hip and LE for self care, mobility, lifting, and ambulation/stair navigation      Manual Treatments:  PROM / STM / Oscillations-Mobs:  G-I, II, III, IV (PA's, Inf., Post.)  [] (87925) Provided manual therapy to mobilize LE, proximal hip and/or LS spine soft tissue/joints for the purpose of modulating pain, promoting relaxation,  increasing ROM, reducing/eliminating soft tissue swelling/inflammation/restriction, improving soft tissue extensibility and allowing for proper ROM for normal function with self care, mobility, lifting and ambulation. Modalities: Ice to go    Charges:  Timed Code Treatment Minutes: 40   Total Treatment Minutes: 60       [] EVAL (LOW) 76480 (typically 20 minutes face-to-face)  [] EVAL (MOD) 21270 (typically 30 minutes face-to-face)  [] EVAL (HIGH) 60441 (typically 45 minutes face-to-face)  [] RE-EVAL   [x] CD(49985) x  1   [] IONTO  [] NMR (01268) x      [] VASO  [] Manual (31314) x       [x] Other: 1PPT  [x] TA x  1    [] Mech Traction (14858)  [] ES(attended) (92795)      [] ES (un) (77337):     GOALS:  Patient stated goal: Return to walking 1 mile at [place] for fitness     Therapist goals for Patient:   Short Term Goals: To be achieved in: 4 weeks  1. Independent in HEP and progression per patient tolerance, in order to prevent re-injury. 2. Patient will have a decrease in pain to <2/10 at max to facilitate improvement in movement, function, and ADLs as indicated by Functional Deficits. 3. Patient will maintain L Knee AROM 0-135 prior to surgery.     Long Term Goals: To be achieved in: 12 weeks post-operatively  1.  Disability index score of 40% or less

## 2018-01-16 NOTE — PROGRESS NOTES
Chief Complaint    Follow-up (surgical discussion of left knee)      History of Present Illness:  Hinda Cranker is a 50 y.o. female     Patient is scheduled for left anterior cruciate ligament reconstruction and medial meniscus tear. She is here to review the planned surgery and signed the operative report. Medical History:  Patient's medications, allergies, past medical, surgical, social and family histories were reviewed and updated as appropriate. Allergies   Allergen Reactions    Sulfa Antibiotics Nausea And Vomiting     Current Outpatient Prescriptions on File Prior to Visit   Medication Sig Dispense Refill    oxyCODONE-acetaminophen (PERCOCET) 5-325 MG per tablet Take 1-2 tablets by mouth every 4 hours as needed for Pain . 20 tablet 0    b complex vitamins capsule Take 1 capsule by mouth daily      butalbital-acetaminophen-caffeine (FIORICET, ESGIC) -40 MG per tablet Take 1 tablet by mouth every 4 hours as needed for Headaches      Topiramate (TOPAMAX PO) Take 50 mg by mouth 3 times daily      FLUoxetine (PROZAC) 40 MG capsule Take 40 mg by mouth daily.  cetirizine (ZYRTEC) 10 MG tablet Take 10 mg by mouth daily.  multivitamin (THERAGRAN) per tablet Take 1 tablet by mouth daily.  ibuprofen (ADVIL;MOTRIN) 600 MG tablet Take 1 tablet by mouth every 6 hours as needed for Pain 30 tablet 0     No current facility-administered medications on file prior to visit. Review of Systems:  Relevant review of systems reviewed and available in the patient's chart    Vital Signs:  Vitals:    01/16/18 1133   BP: 120/87   Pulse: 66       General Exam:       Physical Exam   Constitutional:  oriented to person, place, and time,  appears well-developed and well-nourished. Musculoskeletal:        Right hip: exhibits normal range of motion, normal strength, no tenderness, no bony tenderness, no swelling, no crepitus and no deformity.         Left hip:  exhibits normal range of motion,

## 2018-01-17 ENCOUNTER — HOSPITAL ENCOUNTER (OUTPATIENT)
Dept: SURGERY | Age: 53
Discharge: HOME | End: 2018-01-17
Admitting: ORTHOPAEDIC SURGERY

## 2018-01-17 VITALS
OXYGEN SATURATION: 93 % | RESPIRATION RATE: 16 BRPM | SYSTOLIC BLOOD PRESSURE: 106 MMHG | BODY MASS INDEX: 22.88 KG/M2 | HEART RATE: 67 BPM | WEIGHT: 134 LBS | TEMPERATURE: 98.6 F | DIASTOLIC BLOOD PRESSURE: 72 MMHG | HEIGHT: 64 IN

## 2018-01-17 LAB — PREGNANCY, URINE: NEGATIVE

## 2018-01-17 RX ORDER — SCOLOPAMINE TRANSDERMAL SYSTEM 1 MG/1
1 PATCH, EXTENDED RELEASE TRANSDERMAL ONCE
Status: DISCONTINUED | OUTPATIENT
Start: 2018-01-17 | End: 2018-01-18 | Stop reason: HOSPADM

## 2018-01-17 RX ORDER — ONDANSETRON 2 MG/ML
4 INJECTION INTRAMUSCULAR; INTRAVENOUS
Status: ACTIVE | OUTPATIENT
Start: 2018-01-17 | End: 2018-01-17

## 2018-01-17 RX ORDER — OXYCODONE HYDROCHLORIDE AND ACETAMINOPHEN 5; 325 MG/1; MG/1
1 TABLET ORAL PRN
Status: ACTIVE | OUTPATIENT
Start: 2018-01-17 | End: 2018-01-17

## 2018-01-17 RX ORDER — FENTANYL CITRATE 50 UG/ML
25 INJECTION, SOLUTION INTRAMUSCULAR; INTRAVENOUS EVERY 5 MIN PRN
Status: DISCONTINUED | OUTPATIENT
Start: 2018-01-17 | End: 2018-01-18 | Stop reason: HOSPADM

## 2018-01-17 RX ORDER — FENTANYL CITRATE 50 UG/ML
50 INJECTION, SOLUTION INTRAMUSCULAR; INTRAVENOUS EVERY 5 MIN PRN
Status: DISCONTINUED | OUTPATIENT
Start: 2018-01-17 | End: 2018-01-18 | Stop reason: HOSPADM

## 2018-01-17 RX ORDER — OXYCODONE HYDROCHLORIDE AND ACETAMINOPHEN 5; 325 MG/1; MG/1
1 TABLET ORAL EVERY 4 HOURS PRN
Status: CANCELLED | OUTPATIENT
Start: 2018-01-17

## 2018-01-17 RX ORDER — OXYCODONE HYDROCHLORIDE AND ACETAMINOPHEN 5; 325 MG/1; MG/1
2 TABLET ORAL PRN
Status: ACTIVE | OUTPATIENT
Start: 2018-01-17 | End: 2018-01-17

## 2018-01-17 RX ORDER — MORPHINE SULFATE 2 MG/ML
2 INJECTION, SOLUTION INTRAMUSCULAR; INTRAVENOUS
Status: CANCELLED | OUTPATIENT
Start: 2018-01-17

## 2018-01-17 RX ORDER — HYDRALAZINE HYDROCHLORIDE 20 MG/ML
5 INJECTION INTRAMUSCULAR; INTRAVENOUS EVERY 10 MIN PRN
Status: DISCONTINUED | OUTPATIENT
Start: 2018-01-17 | End: 2018-01-18 | Stop reason: HOSPADM

## 2018-01-17 RX ORDER — SODIUM CHLORIDE, SODIUM LACTATE, POTASSIUM CHLORIDE, CALCIUM CHLORIDE 600; 310; 30; 20 MG/100ML; MG/100ML; MG/100ML; MG/100ML
INJECTION, SOLUTION INTRAVENOUS CONTINUOUS
Status: DISCONTINUED | OUTPATIENT
Start: 2018-01-17 | End: 2018-01-18 | Stop reason: HOSPADM

## 2018-01-17 RX ORDER — ONDANSETRON 2 MG/ML
4 INJECTION INTRAMUSCULAR; INTRAVENOUS EVERY 6 HOURS PRN
Status: CANCELLED | OUTPATIENT
Start: 2018-01-17

## 2018-01-17 RX ORDER — LABETALOL HYDROCHLORIDE 5 MG/ML
5 INJECTION, SOLUTION INTRAVENOUS EVERY 10 MIN PRN
Status: DISCONTINUED | OUTPATIENT
Start: 2018-01-17 | End: 2018-01-18 | Stop reason: HOSPADM

## 2018-01-17 RX ADMIN — Medication 0.25 MG: at 13:47

## 2018-01-17 RX ADMIN — Medication 0.25 MG: at 14:18

## 2018-01-17 RX ADMIN — Medication 0.25 MG: at 14:05

## 2018-01-17 RX ADMIN — SODIUM CHLORIDE, SODIUM LACTATE, POTASSIUM CHLORIDE, CALCIUM CHLORIDE: 600; 310; 30; 20 INJECTION, SOLUTION INTRAVENOUS at 10:16

## 2018-01-17 ASSESSMENT — PAIN SCALES - GENERAL
PAINLEVEL_OUTOF10: 4
PAINLEVEL_OUTOF10: 5
PAINLEVEL_OUTOF10: 6
PAINLEVEL_OUTOF10: 3

## 2018-01-17 ASSESSMENT — PAIN - FUNCTIONAL ASSESSMENT: PAIN_FUNCTIONAL_ASSESSMENT: 0-10

## 2018-01-17 ASSESSMENT — PAIN DESCRIPTION - DESCRIPTORS: DESCRIPTORS: THROBBING;POUNDING

## 2018-01-17 ASSESSMENT — LIFESTYLE VARIABLES: SMOKING_STATUS: 0

## 2018-01-17 NOTE — BRIEF OP NOTE
Brief Postoperative Note    Gayla Holbrook  YOB: 1965  [id number]    Pre-operative Diagnosis: L ACL tear     Post-operative Diagnosis: Partial ACL tear.  Medial meniscus tear    Procedure: Arthroscopic L medial meniscus repair    Anesthesia: General    Surgeons/Assistants: Fernando Lambert    Estimated Blood Loss: nil    Complications: None    Specimens: none    Stable to PACU    Electronically signed by Damir Osborne DO on 1/17/2018 at 12:03 PM

## 2018-01-17 NOTE — PROGRESS NOTES
PACU Transfer to [facility]    Vitals:    01/17/18 1430   BP: 94/64   Pulse: 66   Resp: 15   Temp: 99.2 °F (37.3 °C)   SpO2: 92%         Intake/Output Summary (Last 24 hours) at 01/17/18 1442  Last data filed at 01/17/18 1430   Gross per 24 hour   Intake              170 ml   Output                0 ml   Net              170 ml       Pain assessment:  present - adequately treated  Pain Level: 3    Patient transferred to care of [facility] RN. Family waiting room notified of patients transfer to [facility]. No further changes.      1/17/2018 2:42 PM
Report received from CRNA pt arrived calm no SS of pain
device  []Wound Support Device  [] Crutches   []Drain    [] Walker   [x]Other:BLE SCD Sleeves, Ice packs  [] Inpatient / significant other understands the plan for transfer to the inpatient unit

## 2018-01-17 NOTE — ANESTHESIA PRE-OP
KNEE LATERAL UNI TITA             NASAL SINUS SURGERY         Social History:    Social History   Substance Use Topics    Smoking status: Never Smoker    Smokeless tobacco: Never Used    Alcohol use 1.8 oz/week     3 Glasses of wine per week                                Counseling given: Not Answered      Vital Signs (Current):   Vitals:    01/17/18 0937   BP: 112/79   Pulse: 68   Resp: 16   Temp: 98.1 °F (36.7 °C)   TempSrc: Oral   SpO2: 96%   Weight: 134 lb (60.8 kg)   Height: 5' 4\" (1.626 m)                                              BP Readings from Last 3 Encounters:   01/17/18 112/79   01/16/18 120/87   12/14/17 114/81       NPO Status: Time of last liquid consumption: 0730 (sip of water with meds)                        Time of last solid consumption: 2340                        Date of last liquid consumption: 01/17/18                        Date of last solid food consumption: 01/16/18    BMI:   Wt Readings from Last 3 Encounters:   01/17/18 134 lb (60.8 kg)   01/16/18 134 lb 0.6 oz (60.8 kg)   01/11/18 134 lb (60.8 kg)     Body mass index is 23 kg/m². Anesthesia Evaluation  Patient summary reviewed   history of anesthetic complications: PONV. Airway: Mallampati: I  TM distance: >3 FB   Neck ROM: full  Mouth opening: > = 3 FB Dental: normal exam         Pulmonary: breath sounds clear to auscultation      (-) COPD, asthma, sleep apnea and not a current smoker          Patient did not smoke on day of surgery.                  Cardiovascular:  Exercise tolerance: good (>4 METS),       (-) hypertension, past MI, CAD, CABG/stent, dysrhythmias,  angina,  CHF, orthopnea, PND and  KELLY      Rhythm: regular  Rate: normal           Beta Blocker:  Not on Beta Blocker         Neuro/Psych:   (+) headaches: migraine headaches,    (-) TIA and CVA            ROS comment: Takes topamax for migraines GI/Hepatic/Renal:        (-) GERD, PUD, hepatitis, liver disease, no renal disease, bowel prep and no morbid

## 2018-01-18 ENCOUNTER — HOSPITAL ENCOUNTER (OUTPATIENT)
Dept: PHYSICAL THERAPY | Age: 53
Discharge: HOME | End: 2018-01-18
Admitting: ORTHOPAEDIC SURGERY

## 2018-01-18 NOTE — PLAN OF CARE
Post.)  [x] (92325) Provided manual therapy to mobilize LE, proximal hip and/or LS spine soft tissue/joints for the purpose of modulating pain, promoting relaxation,  increasing ROM, reducing/eliminating soft tissue swelling/inflammation/restriction, improving soft tissue extensibility and allowing for proper ROM for normal function with self care, mobility, lifting and ambulation. Modalities:  Vaso 15'    Charges:  Timed Code Treatment Minutes: 30   Total Treatment Minutes: 60       [] EVAL (LOW) 01738 (typically 20 minutes face-to-face)  [] EVAL (MOD) 36694 (typically 30 minutes face-to-face)  [] EVAL (HIGH) 70919 (typically 45 minutes face-to-face)  [x] RE-EVAL   [x] TM(72721) x  1   [] IONTO  [] NMR (75332) x      [x] VASO  [x] Manual (07077) x  1    [] Other: 1PPT  [] TA x       [] Mech Traction (73627)  [] ES(attended) (40253)      [] ES (un) (24191):     GOALS:  Patient stated goal: Return to walking 1 mile at [place] for fitness     Therapist goals for Patient:   Short Term Goals: To be achieved in: 4 weeks  1. Independent in HEP and progression per patient tolerance, in order to prevent re-injury. 2. Patient will have a decrease in pain to <2/10 at max to facilitate improvement in movement, function, and ADLs as indicated by Functional Deficits. 3. Patient will maintain L Knee AROM 0-135 prior to surgery.     Long Term Goals: To be achieved in: 12 weeks post-operatively  1. Disability index score of 40% or less for the LEFS to assist with reaching prior level of function. 2. Patient will demonstrate increased AROM to 0-135 to allow for proper joint functioning as indicated by patients Functional Deficits. 3. Patient will demonstrate an increase in Strength to 4/5 or greater in BLE to allow for proper functional mobility as indicated by patients Functional Deficits. 4. Patient will be able to walk 1 mile without increase in pain or swelling. (patient specific functional goal)  5.  Patient will

## 2018-01-18 NOTE — OP NOTE
[facility]                 [address]                                 OPERATIVE REPORT    PATIENT NAME: Flaquito Velázquez                  :        1965  MED REC NO:   [id number]                          ROOM:  ACCOUNT NO:   [de-identified]                          ADMIT DATE: 2018  PROVIDER:     Lance Lopez MD    DATE OF PROCEDURE:  2018    PREOPERATIVE DIAGNOSIS:  Anterior cruciate ligament injury, left knee with  associated posterior horn medial meniscus tear. POSTOPERATIVE DIAGNOSIS:  Anterior cruciate ligament injury, left knee with  associated posterior horn medial meniscus tear. OPERATIVE PROCEDURE:  1. Arthroscopically assisted medial meniscus repair, left knee. 2.  Arthroscopic three-compartment synovectomy, reactive synovial tissue. SURGEON:  Lance Lopez MD    FIRST ASSISTANT:  Chi Brewster DO    ANESTHESIA:  General    OPERATIVE INDICATIONS:  This patient understood the associated risks,  benefits and consented to the operative procedure. OPERATIVE FINDINGS:  1. Partial anterior cruciate ligament tear with two-thirds of the ligament  principally the anteromedial bundle intact. The posterolateral bundle has  been torn. With the two-thirds tear she has a proximal 70% success rate  and then she will remain as a functional anterior cruciate ligament,  however, she will be advised that there is a small chance that the anterior  cruciate ligament will subsequently tear. Still it is very worthwhile to  treat this conservatively as the majority will never require anterior  cruciate reconstruction. 2.  The 12-mm red-white meniscus tear was nicely fixed with FAST-FIX  sutures and again has an 80% success rate.     OPERATIVE PROCEDURE: This patient was placed in supine position upon the  operating table and after satisfactory level of general anesthesia, the  left knee was prepped and draped to sterile Consent (Lip)/Introductory Paragraph: The rationale for Mohs was explained to the patient and consent was obtained. The risks, benefits and alternatives to therapy were discussed in detail. Specifically, the risks of lip deformity, changes in the oral aperture, infection, scarring, bleeding, prolonged wound healing, incomplete removal, allergy to anesthesia, nerve injury and recurrence were addressed. Prior to the procedure, the treatment site was clearly identified and confirmed by the patient. All components of Universal Protocol/PAUSE Rule completed.

## 2018-01-23 ENCOUNTER — HOSPITAL ENCOUNTER (OUTPATIENT)
Dept: PHYSICAL THERAPY | Age: 53
Discharge: HOME | End: 2018-01-23
Admitting: ORTHOPAEDIC SURGERY

## 2018-01-23 DIAGNOSIS — S83.512A RUPTURE OF ANTERIOR CRUCIATE LIGAMENT OF LEFT KNEE, INITIAL ENCOUNTER: Primary | ICD-10-CM

## 2018-01-23 RX ORDER — OXYCODONE HYDROCHLORIDE AND ACETAMINOPHEN 5; 325 MG/1; MG/1
1 TABLET ORAL EVERY 6 HOURS PRN
Qty: 28 TABLET | Refills: 0 | Status: SHIPPED | OUTPATIENT
Start: 2018-01-23 | End: 2018-01-30

## 2018-01-23 NOTE — FLOWSHEET NOTE
Extension     0 +5                                               1/18/18  Strength L R Comment   Quad 2-/5  1/5 5/5  5/5 Quad Set  VMO   Hamstring      Gastroc         Hip  flexion         Hip abd                                1/18/18  Special Test Results/Comment   Meniscal Click Neg   Crepitus Neg   Flexion Test Neg   Valgus Laxity Mild on L   Varus Laxity Neg   Lachmans Pos on R   Drop Back Neg   Homans Neg   Temperature 98.6      1/23/18  Girth L R   Mid Patella 35.6 35.9   Suprapatellar 36.8 35.8   5cm above 37.0 36.9   15cm above 42.5 43.7      Reflexes/Sensation:               [x]Dermatomes/Myotomes intact               [x]Reflexes equal and normal bilaterally              []Other:     Joint mobility:                [x]Normal               []Hypo              []Hyper     Palpation: generalized PO tenderness 1/18/18     Functional Mobility/Transfers: Independent 1/18/18     Posture: WNL 1/18/18     Bandages/Dressings/Incisions: Post-op bandages removed today; no drainage but mild swelling noted; No signs of infection; no calf tenderness or warmth; CAROL hose applied and TROM at end of PT session 1/18/18     Gait: (include devices/WB status) ambulates with TROM in place using bilateral axillary crutches with <25% WB 1/18/18     Orthopedic Special Tests: see above    Isometric Strength Testing Results Summary  Knee    On 1/23/2018 the patient, Lyn Freeman, underwent an Isometric Strength Test to evaluate current status and progress of the strengthening phase of his/her current rehabilitation program.  She is currently being treated for Pre-op L Knee ACL reconstruction + Medial Meniscus repair vs. excision. Attached you will find a computer generated summary of the results which outlines the current status. To summarize these results you will find that Lyn Freeman underwent a test measuring the strength of the knee Quadriceps and Hamstrings muscle groups at the isometric angle 60 degrees. 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 A muscle stim device with conductive garment is being prescribed to facilitate strengthening of their quad contraction. This is in accordance with the therapist's established rehabilitation plan to treat quad atrophy. Michaela Alva, PT      Patient Education:   12/6/17: Patient educated about PT diagnosis, prognosis, and plan of care; educated on role of physical therapy; educated on HEP; educated on anatomy of ACL and mensicus; on activities to avoid; on importance of maintenance of ROM pre-operatively. 1/16/18: Educated patient on all pre-op and post-op instructions including but not limited to R.I.C.E., post-op HEP, DVT prevention, warning signs of infection and DVT, possible WB restrictions. 1/18/18: Full PO instructions including WB status and use of TROM  1/23/18: education re: quad stretch    HEP:Patient instructed on HEP on this date with handout provided and all questions answered. Patient was instructed to contact PT with any complications or questions about HEP moving forward. Patient verbally stated she understood. Updated 1/23/18    Therapeutic Exercise and NMR EXR  [x] (22649) Provided verbal/tactile cueing for activities related to strengthening, flexibility, endurance, ROM for improvements in LE, proximal hip, and core control with self care, mobility, lifting, ambulation.  [] (49489) Provided verbal/tactile cueing for activities related to improving balance, coordination, kinesthetic sense, posture, motor skill, proprioception  to assist with LE, proximal hip, and core control in self care, mobility, lifting, ambulation and eccentric single leg control.      NMR and Therapeutic Activities:    [x] (95442 or 98174) Provided verbal/tactile cueing for activities related to improving balance, coordination, kinesthetic sense, posture, motor skill, proprioception and motor activation to allow for proper function of core, proximal hip and LE with self care and ADLs  [] (09199) Gait Re-education- Provided training and instruction to the patient for proper LE, core and proximal hip recruitment and positioning and eccentric body weight control with ambulation re-education including up and down stairs     Home Exercise Program:    [x] (51890) Reviewed/Progressed HEP activities related to strengthening, flexibility, endurance, ROM of core, proximal hip and LE for functional self-care, mobility, lifting and ambulation/stair navigation   [] (87255)Reviewed/Progressed HEP activities related to improving balance, coordination, kinesthetic sense, posture, motor skill, proprioception of core, proximal hip and LE for self care, mobility, lifting, and ambulation/stair navigation      Manual Treatments:  PROM / STM / Oscillations-Mobs:  G-I, II, III, IV (PA's, Inf., Post.)  [x] (08317) Provided manual therapy to mobilize LE, proximal hip and/or LS spine soft tissue/joints for the purpose of modulating pain, promoting relaxation,  increasing ROM, reducing/eliminating soft tissue swelling/inflammation/restriction, improving soft tissue extensibility and allowing for proper ROM for normal function with self care, mobility, lifting and ambulation. Modalities:  Vaso 13' ; NMES 15'    Charges:  Timed Code Treatment Minutes: 39'   Total Treatment Minutes: 80'       [] EVAL (LOW) 22271 (typically 20 minutes face-to-face)  [] EVAL (MOD) 37131 (typically 30 minutes face-to-face)  [] EVAL (HIGH) 98495 (typically 45 minutes face-to-face)  [] RE-EVAL   [x] AS(02578) x  1   [] IONTO  [x] NMR (32540) x  1   [x] VASO  [] Manual (50622) x       [] Other: 1PPT  [x] TA x  1    [] Mech Traction (27033)  [] ES(attended) (00571)      [x] ES (un) (43224):     GOALS:  Patient stated goal[place]     Therapist goals for Patient:   Short Term Goals:  To be

## 2018-01-26 ENCOUNTER — HOSPITAL ENCOUNTER (OUTPATIENT)
Dept: PHYSICAL THERAPY | Age: 53
Discharge: HOME | End: 2018-01-26
Admitting: ORTHOPAEDIC SURGERY

## 2018-01-30 ENCOUNTER — HOSPITAL ENCOUNTER (OUTPATIENT)
Dept: PHYSICAL THERAPY | Age: 53
Discharge: HOME | End: 2018-01-30
Admitting: ORTHOPAEDIC SURGERY

## 2018-01-30 NOTE — FLOWSHEET NOTE
Flexion     115 142     ERMI   Extension     0 +5                                               1/18/18  Strength L R Comment   Quad 2-/5  1/5 5/5  5/5 Quad Set  VMO   Hamstring      Gastroc         Hip  flexion         Hip abd                                1/18/18  Special Test Results/Comment   Meniscal Click Neg   Crepitus Neg   Flexion Test Neg   Valgus Laxity Mild on L   Varus Laxity Neg   Lachmans Pos on R   Drop Back Neg   Homans Neg   Temperature 98.6      1/23/18  Girth L R   Mid Patella 35.6 35.9   Suprapatellar 36.8 35.8   5cm above 37.0 36.9   15cm above 42.5 43.7      Reflexes/Sensation:               [x]Dermatomes/Myotomes intact               [x]Reflexes equal and normal bilaterally              []Other:     Joint mobility:                [x]Normal               []Hypo              []Hyper     Palpation: generalized PO tenderness 1/18/18     Functional Mobility/Transfers: Independent 1/18/18     Posture: WNL 1/18/18     Bandages/Dressings/Incisions: Post-op bandages removed today; no drainage but mild swelling noted; No signs of infection; no calf tenderness or warmth; CAROL hose applied and TROM at end of PT session 1/18/18     Gait: (include devices/WB status) ambulates with TROM in place using bilateral axillary crutches with <25% WB 1/18/18     Orthopedic Special Tests: see above    Isometric Strength Testing Results Summary  Knee    On 1/30/2018 the patient, Troy Rivers, underwent an Isometric Strength Test to evaluate current status and progress of the strengthening phase of his/her current rehabilitation program.  She is currently being treated for Pre-op L Knee ACL reconstruction + Medial Meniscus repair vs. excision. Attached you will find a computer generated summary of the results which outlines the current status.       To summarize these results you will find that Troy Rivers underwent a test measuring the strength of the knee Quadriceps and Hamstrings muscle groups at the in HEP and progression per patient tolerance, in order to prevent re-injury. 2. Patient will have a decrease in pain to <2/10 at max to facilitate improvement in movement, function, and ADLs as indicated by Functional Deficits. 3. Patient will maintain L Knee AROM 0-135 prior to surgery.     Long Term Goals: To be achieved in: 12 weeks post-operatively  1. Disability index score of 40% or less for the LEFS to assist with reaching prior level of function. 2. Patient will demonstrate increased AROM to 0-135 to allow for proper joint functioning as indicated by patients Functional Deficits. 3. Patient will demonstrate an increase in Strength to 4/5 or greater in BLE to allow for proper functional mobility as indicated by patients Functional Deficits. 4. Patient will be able to walk 1 mile without increase in pain or swelling. (patient specific functional goal)  5. 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 [] Progression is slowed due to complexities listed. [] Progression has been slowed due to co-morbidities. [] Plan just implemented, too soon to assess goals progression  [] Other:     ASSESSMENT:  See eval    Treatment/Activity Tolerance:  [x] Patient tolerated treatment well [] Patient limited by fatique  [] Patient limited by pain  [] Patient limited by other medical complications  [x] Other: Much improved tolerance to PT this visit again today. Quad muscle still has mild to moderate restrictions (specifically rectus femoris) with IASTM but this is improved compared to last PT visit. ROM improved to 115 degrees on ERMI today. Patient's strength and quad activation improving as well as she was able to complete 3 x 15 SLRs today without pain or discomfort in her quad muscle.  Patient still requires further therapy to address decreased ROM, decreased strength, increased pain, increased swelling and impaired gait mechanics.        Prognosis: [x] Good [] Fair  [] Poor    Patient Requires Follow-up: [x] Yes  [] No    PLAN: See eval  [] Continue per plan of care [] Alter current plan (see comments)  [x] Plan of care initiated [] Hold pending MD visit [] Discharge    Electronically signed by: Kimberly Dudley PT, DPT

## 2018-02-01 ENCOUNTER — HOSPITAL ENCOUNTER (OUTPATIENT)
Dept: PHYSICAL THERAPY | Age: 53
Discharge: HOME | End: 2018-02-28
Attending: ORTHOPAEDIC SURGERY | Admitting: ORTHOPAEDIC SURGERY

## 2018-02-06 ENCOUNTER — OFFICE VISIT (OUTPATIENT)
Dept: ORTHOPEDIC SURGERY | Age: 53
End: 2018-02-06

## 2018-02-06 ENCOUNTER — HOSPITAL ENCOUNTER (OUTPATIENT)
Dept: PHYSICAL THERAPY | Age: 53
Discharge: HOME | End: 2018-02-07
Admitting: ORTHOPAEDIC SURGERY

## 2018-02-06 VITALS
HEART RATE: 71 BPM | WEIGHT: 134 LBS | SYSTOLIC BLOOD PRESSURE: 108 MMHG | DIASTOLIC BLOOD PRESSURE: 75 MMHG | HEIGHT: 64 IN | BODY MASS INDEX: 22.88 KG/M2

## 2018-02-06 DIAGNOSIS — Z98.890 S/P LEFT KNEE ARTHROSCOPY: ICD-10-CM

## 2018-02-06 DIAGNOSIS — S83.511A RUPTURE OF ANTERIOR CRUCIATE LIGAMENT OF RIGHT KNEE, INITIAL ENCOUNTER: Primary | ICD-10-CM

## 2018-02-06 PROCEDURE — 99024 POSTOP FOLLOW-UP VISIT: CPT | Performed by: ORTHOPAEDIC SURGERY

## 2018-02-06 RX ORDER — TRAMADOL HYDROCHLORIDE 50 MG/1
50 TABLET ORAL EVERY 6 HOURS PRN
Qty: 20 TABLET | Refills: 0 | Status: SHIPPED | OUTPATIENT
Start: 2018-02-06 | End: 2018-02-11

## 2018-02-06 NOTE — PROGRESS NOTES
Chief Complaint    Post-Op Check (3 week check on left knee sx 1/18/18)      History of Present Illness:  Loanne Favre is a 50 y.o. female who presents for follow up of her left knee. The patient underwent left knee arthroscopy for medial meniscus repair on 1/17/2018 patient is currently 3 weeks postop. Overall she reports that she is doing well. Currently she is 20% weightbearing and is using crutches when ambulating. She's been consistently going to physical therapy. She denies any fevers, chills or calf tenderness. Past Medical History:   Diagnosis Date    Allergic rhinitis     Arthritis     Chronic pain Right knee    Lateral meniscus tear     right knee    Migraines     Nausea & vomiting     post-op    PONV (postoperative nausea and vomiting)         Past Surgical History:   Procedure Laterality Date    BREAST SURGERY      Reduction    KNEE ARTHROSCOPY      6 times on right knee    KNEE ARTHROSCOPY Right 01/23/2013    KNEE ARTHROSCOPY Right 01/23/2013    RIGHT KNEE ARTHROSCOPY LATERAL MENISCUS EXCISION,STEROID    KNEE ARTHROSCOPY Right 02/04/2013    RIGHT KNEE ARTHROSCOPY, SYNOVECTOMY; INCISION AND DRAINAGE    KNEE ARTHROSCOPY Left 01/17/2018    KNEE SURGERY Right 7/24/2013    RIGHT KNEE LATERAL UNI TITA             NASAL SINUS SURGERY         No family history on file. Social History     Social History    Marital status:      Spouse name: N/A    Number of children: N/A    Years of education: N/A     Social History Main Topics    Smoking status: Never Smoker    Smokeless tobacco: Never Used    Alcohol use 1.8 oz/week     3 Glasses of wine per week    Drug use: No    Sexual activity: Not Asked     Other Topics Concern    None     Social History Narrative    None       Current Outpatient Prescriptions   Medication Sig Dispense Refill    traMADol (ULTRAM) 50 MG tablet Take 1 tablet by mouth every 6 hours as needed for Pain for up to 5 days .  Take lowest dose possible intact    Special Tests:  No ligament instability     Gait: She is 20% weightbearing using crutches for ambulation and wears a brace and her left lower extremity. Radiology:     X-rays obtained and reviewed in office: None      Office Procedures:  No orders of the defined types were placed in this encounter. Assessment: Patient is a 82-year-old female who underwent a left knee arthroscopy for medial meniscus repair on 1/17/2018. Encounter Diagnoses   Name Primary?  Rupture of anterior cruciate ligament of right knee, initial encounter Yes    S/P left knee arthroscopy        Treatment Plan:  Overall she is doing well in her recovery. We'll recommend that she go back to physical therapy. She will continue the protocol for meniscus repairs. The physical therapist will gradually transition her back to weight bearing status in 1 week. All of her questions were fully answered today. We will see her back in 3 weeks for follow-up visit. Follow up in: 3 weeks p.r.n.      3:52 PM      Naye Rojas, [address] [place] [address] Physician Assistant    During this examination, Ba Baker PA-C, functioned as a scribe for Dr. Hilary Portillo. This dictation was performed with a verbal recognition program (DRAGON) and it was checked for errors. It is possible that there are still dictated errors within this office note. If so, please bring any errors to my attention for an addendum. All efforts were made to ensure that this office note is accurate. This dictation was performed with a verbal recognition program (DRAGON) and it was checked for errors. It is possible that there are still dictated errors within this office note. If so, please bring any errors to my attention for an addendum. All efforts were made to ensure that this office note is accurate.     Supervising Physician Attestation:  I, Dr. Hilary Portillo, personally performed the services described in this documentation as scribed

## 2018-02-06 NOTE — FLOWSHEET NOTE
[facility]  Orthopaedics and Sports Rehabilitation, [facility]    Physical Therapy Daily Treatment Note  Date:  2018    Patient Name:  Nadeen Nieto    :  1965  MRN: [id number]  Restrictions/Precautions:  None  Medical/Treatment Diagnosis Information:  · Diagnosis: T25.690U (ICD-10-CM) - Sprain of anterior cruciate ligament of left knee, sequela  · Treatment Diagnosis: Decreased ROM, decreased quad activation, increased swelling, impaired gait mechanics  · PO L Knee  MM repair  · DOS: 18  · Medications: Percocet (0.5-1 every 6 hours); baby aspirin  Insurance/Certification information:  PT Insurance Information: PT BENEFITS 2018 FACILITY/ [employer]/ EFFECTIVE 18/ ACTIVE/ DED 5500/ PAYS 80%/ OOP 6550/ 40 VPCY/ 0 AUTH/ TEKO/ CBO#[id number]/ 1-15-18 PAG/PW DONE  CB/   Physician Information:  Referring Practitioner: Conrad Leong of care signed (Y/N): Signed     Date of Patient follow up with Physician:    Patient seen in consultation with Dr. Camilla Melgar who established the initial/subsequent treatment protocol.   18: ACL was only partially torn and 2/3 intact so repair was not necessary, 30% chance of full tear in the future if participates in twisting/impact activities, 25-30% PWB x 3-4 weeks then progress  18: saw MD upstairs for 3 week follow up    G-Code (if applicable):      Date G-Code Applied:  18        Progress Note: [x]  Yes  []  No  Next due by: Visit #14       Latex Allergy:  [x]NO      []YES  Preferred Language for Healthcare:   [x]English       []other:    Visit # Insurance Allowable   6 ()  2 () 40 VPCY  0 AUTH     BFR Research Study Participant   Group GROUP A: EARLY BFR @ 4 WEEKS   4 Week Post-op Strength Test/Girth Update    8 Week Post-op Strength Test/Girth Update    12 Week Post-op Strength Test/Girth Update    16 Week Post-op Strength Test/Girth Update    20 Week Post-op Strength Test/Girth Update        Pain level:  4/10 at rest    SUBJECTIVE:  Patient presents to PT today 3 weeks PO L Knee Medial Meniscus Repair. Doing well today. Saw MD upstairs before PT visit who wants patient to wean from TROM brace and start BFR therapy. Patient has mild aching pain over medial aspect of knee but nothing severe. Has noticed her knee gets stiff but knows this is common for after surgery. OBJECTIVE:      2/6/18  ROM PROM AROM Overpressure Comment     L R L R L R     Flexion     112  132 142     Cold  ERMI   Extension     +2 +5                                               1/18/18  Strength L R Comment   Quad 2-/5  1/5 5/5  5/5 Quad Set  VMO   Hamstring      Gastroc         Hip  flexion         Hip abd                                1/18/18  Special Test Results/Comment   Meniscal Click Neg   Crepitus Neg   Flexion Test Neg   Valgus Laxity Mild on L   Varus Laxity Neg   Lachmans Pos on R   Drop Back Neg   Homans Neg   Temperature 98.6      2/6/18  Girth L R   Mid Patella 35.5 35.9   Suprapatellar 36.0 35.8   5cm above 36.8 36.9   15cm above 42.5 43.7      Reflexes/Sensation:               [x]Dermatomes/Myotomes intact               [x]Reflexes equal and normal bilaterally              []Other:     Joint mobility:                [x]Normal               []Hypo              []Hyper     Palpation: generalized PO tenderness 1/18/18     Functional Mobility/Transfers: Independent 1/18/18     Posture: WNL 1/18/18     Bandages/Dressings/Incisions: Post-op bandages removed today; no drainage but mild swelling noted;  No signs of infection; no calf tenderness or warmth; CAROL hose applied and TROM at end of PT session 1/18/18     Gait: (include devices/WB status) ambulates with TROM in place using bilateral axillary crutches with <25% WB 1/18/18     Orthopedic Special Tests: see above    Isometric Strength Testing Results Summary  Knee    On 2/6/2018 the patient, Edgar Conner, underwent an Isometric Strength Test to evaluate current status and progress of (typically 20 minutes face-to-face)  [] EVAL (MOD) 41155 (typically 30 minutes face-to-face)  [] EVAL (HIGH) 69151 (typically 45 minutes face-to-face)  [] RE-EVAL   [x] HU(25126) x  1   [] IONTO  [x] NMR (13444) x  1   [] VASO  [x] Manual (71434) x  1    [] Other: 1PPT  [x] TA x  1    [] Mech Traction (52650)  [] ES(attended) (44950)      [] ES (un) (42675):     GOALS:  Patient stated goal: Return to walking 1 mile at [employer]     Therapist goals for Patient:   Short Term Goals: To be achieved in: 4 weeks  1. Independent in HEP and progression per patient tolerance, in order to prevent re-injury. 2. Patient will have a decrease in pain to <2/10 at max to facilitate improvement in movement, function, and ADLs as indicated by Functional Deficits. 3. Patient will maintain L Knee AROM 0-135 prior to surgery.     Long Term Goals: To be achieved in: 12 weeks post-operatively  1. Disability index score of 40% or less for the LEFS to assist with reaching prior level of function. 2. Patient will demonstrate increased AROM to 0-135 to allow for proper joint functioning as indicated by patients Functional Deficits. 3. Patient will demonstrate an increase in Strength to 4/5 or greater in BLE to allow for proper functional mobility as indicated by patients Functional Deficits. 4. Patient will be able to walk 1 mile without increase in pain or swelling. (patient specific functional goal)  5. 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 [] Progression is slowed due to complexities listed. [] Progression has been slowed due to co-morbidities.   [] Plan just implemented, too soon to assess goals progression  [] Other:     ASSESSMENT:  See eval    Treatment/Activity Tolerance:  [x] Patient tolerated treatment well [] Patient limited by fatique  [] Patient limited by pain  [] Patient

## 2018-02-08 DIAGNOSIS — S83.511A NEW ACL TEAR, RIGHT, INITIAL ENCOUNTER: Primary | ICD-10-CM

## 2018-02-08 RX ORDER — OXYCODONE HYDROCHLORIDE AND ACETAMINOPHEN 5; 325 MG/1; MG/1
1 TABLET ORAL EVERY 6 HOURS PRN
Qty: 12 TABLET | Refills: 0 | Status: SHIPPED | OUTPATIENT
Start: 2018-02-08 | End: 2018-02-11

## 2018-02-09 ENCOUNTER — HOSPITAL ENCOUNTER (OUTPATIENT)
Dept: PHYSICAL THERAPY | Age: 53
Discharge: HOME | End: 2018-02-10
Admitting: ORTHOPAEDIC SURGERY

## 2018-02-09 NOTE — FLOWSHEET NOTE
for Pre-op L Knee ACL reconstruction + Medial Meniscus repair vs. excision. Attached you will find a computer generated summary of the results which outlines the current status. To summarize these results you will find that Mary Ann Harris underwent a test measuring the strength of the knee Quadriceps and Hamstrings muscle groups at the isometric angle 60 degrees. Standard protocol of testing is to provide pre-test stretching and warm up of both extremities followed by instruction in the test procedure and \"practice\" repetitions prior to each actual test session. The uninjured extremity undergoes the test procedure first followed by the injured extremity. Bilateral Difference:  Quadricep  45.3% [x] Deficit   [] Surplus   Hamstring  70.2% [x] Deficit   [] Surplus     Normative Data:  Quadricep Normal: 60% Patient: 27.0%   Hamstring Normal: 36% Patient: 6.3%     The findings of this test would result with the following summary and recommendations: pre-op biodex test; demonstrates patient has significant strenth deficits in both her R and her L legs for both quads and hamstrings. RESTRICTIONS/PRECAUTIONS: L Knee MMR protocol; 25% PWB x 3-4 weeks (start 1/18/18);  ACL precautions    Exercises/Interventions:  Exercise/Equipment Resistance/Repetitions Other comments   Stretching       Hamstring 5 x 30\" Prop   Hip Flexion     ITB     Figure 4     Inclined Calf     Towel Pull 5 x 30\" Prop           ROM       Active     Weight Shift     Hang Weights     Sheet Pulls     Recumbent Bike 6' AAROM   ERMI 5 x 1'            Patellar Glides                           Isometrics                        SLR       Supine 3 x 10 2#   Prone 3 x 10 0#   Abduction 3 x 10 2#   Adducton     SLR+               Glutes       Bridges     Supine Clams     Side Stepping       Monster walks               CKC       Calf raises     Wall sits     Step ups       Squatting x15 Mini Squats   CC TKE     SL DL               PRE     Extension 3 x 10 RANGE: 90-30, 1#   Flexion 3 x 10 RANGE: Avail, 1#   Leg Press       Cable Column               Balance       Tilt board       SLS      Biodex balance               Other       Bike       Treadmill       Gait Training          Manual Interventions     IASTM 8' Rectus Femoris, VL, VMO         Mary Ann Harris presents with 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 A muscle stim device with conductive garment is being prescribed to facilitate strengthening of their quad contraction. This is in accordance with the therapist's established rehabilitation plan to treat quad atrophy. Shanice Camejo, PT      Patient Education:   12/6/17: Patient educated about PT diagnosis, prognosis, and plan of care; educated on role of physical therapy; educated on HEP; educated on anatomy of ACL and mensicus; on activities to avoid; on importance of maintenance of ROM pre-operatively. 1/16/18: Educated patient on all pre-op and post-op instructions including but not limited to R.I.C.E., post-op HEP, DVT prevention, warning signs of infection and DVT, possible WB restrictions. 1/18/18: Full PO instructions including WB status and use of TROM  1/23/18: education re: quad stretch  2/6/18: educated patient re: 50% PWB and unlocking TROM to 90 degrees    HEP:Patient instructed on HEP on this date with handout provided and all questions answered. Patient was instructed to contact PT with any complications or questions about HEP moving forward. Patient verbally stated she understood.  Updated 1/23/18    Therapeutic Exercise and NMR EXR  [x] (55760) Provided verbal/tactile cueing for activities related to strengthening, flexibility, endurance, ROM for improvements in LE, proximal hip, and core control with self care, mobility, lifting, ambulation.  [] (32741) Provided verbal/tactile cueing for activities related to improving balance, coordination, kinesthetic sense, posture, motor skill, proprioception  to assist with LE, proximal hip, and core control in self care, mobility, lifting, ambulation and eccentric single leg control. NMR and Therapeutic Activities:    [x] (99266 or 33508) Provided verbal/tactile cueing for activities related to improving balance, coordination, kinesthetic sense, posture, motor skill, proprioception and motor activation to allow for proper function of core, proximal hip and LE with self care and ADLs  [] (42762) Gait Re-education- Provided training and instruction to the patient for proper LE, core and proximal hip recruitment and positioning and eccentric body weight control with ambulation re-education including up and down stairs     Home Exercise Program:    [x] (75415) Reviewed/Progressed HEP activities related to strengthening, flexibility, endurance, ROM of core, proximal hip and LE for functional self-care, mobility, lifting and ambulation/stair navigation   [] (99520)Reviewed/Progressed HEP activities related to improving balance, coordination, kinesthetic sense, posture, motor skill, proprioception of core, proximal hip and LE for self care, mobility, lifting, and ambulation/stair navigation      Manual Treatments:  PROM / STM / Oscillations-Mobs:  G-I, II, III, IV (PA's, Inf., Post.)  [x] (63160) Provided manual therapy to mobilize LE, proximal hip and/or LS spine soft tissue/joints for the purpose of modulating pain, promoting relaxation,  increasing ROM, reducing/eliminating soft tissue swelling/inflammation/restriction, improving soft tissue extensibility and allowing for proper ROM for normal function with self care, mobility, lifting and ambulation. Modalities:  Ice 15' ;      Charges:  Timed Code Treatment Minutes: 61'   Total Treatment Minutes: 80'       [] EVAL (LOW) 43283 (typically 20 minutes face-to-face)  [] EVAL (MOD) 95877 (typically 30 minutes face-to-face)  [] EVAL (HIGH) 87028 (typically 45 minutes face-to-face)  [] RE-EVAL   [x] GE(65400) x  1   [] IONTO  [x] NMR (96470) x  1   [] VASO  [x] Manual (35141) x  1    [] Other: 1PPT  [x] TA x  1    [] Mech Traction (60441)  [] ES(attended) (85982)      [] ES (un) (38230):     GOALS:  Patient stated goal: Return to walking 1 mile at [employer] for fitness     Therapist goals for Patient:   Short Term Goals: To be achieved in: 4 weeks  1. Independent in HEP and progression per patient tolerance, in order to prevent re-injury. MET  2. Patient will have a decrease in pain to <2/10 at max to facilitate improvement in movement, function, and ADLs as indicated by Functional Deficits. NOT MET  3. Patient will maintain L Knee AROM 0-135 prior to surgery. MET     Long Term Goals: To be achieved in: 12 weeks post-operatively (4/17/18)  1. Disability index score of 40% or less for the LEFS to assist with reaching prior level of function. 2. Patient will demonstrate increased AROM to 0-135 to allow for proper joint functioning as indicated by patients Functional Deficits. 3. Patient will demonstrate an increase in Strength to 4/5 or greater in BLE to allow for proper functional mobility as indicated by patients Functional Deficits. 4. Patient will be able to walk 1 mile without increase in pain or swelling. (patient specific functional goal)  5. 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 [] Progression is slowed due to complexities listed. [] Progression has been slowed due to co-morbidities. [] Plan just implemented, too soon to assess goals progression  [] Other:     ASSESSMENT:  See eval    Treatment/Activity Tolerance:  [x] Patient tolerated treatment well [] Patient limited by fatique  [] Patient limited by pain  [] Patient limited by other medical complications  [x] Other: Good tolerance again to therapy this visit.  Gait pattern and crutch sequencing much better compared to Tuesday's PT visit. She improved her ROM up to 137 on ERMI and PT educated patient not to push ROM further at this point to protect meniscus repair. PT educated patient re: BFR training which is to start next week. Rectus Femoris still has mild restrictions felt with IASTM but improved this visit. Petechia was achieved again today. Patient still requires further therapy to address decreased ROM, decreased strength, increased pain, increased swelling and impaired gait mechanics.        Prognosis: [x] Good [] Fair  [] Poor    Patient Requires Follow-up: [x] Yes  [] No    PLAN: See eval  [] Continue per plan of care [] Alter current plan (see comments)  [x] Plan of care initiated [] Hold pending MD visit [] Discharge    Functional ACL brace at 8-12 weeks PO  BFR with strength testing next visit    Add calf raises, wall sit    Electronically signed by: Avani Sharma PT, DPT

## 2018-02-12 ENCOUNTER — HOSPITAL ENCOUNTER (OUTPATIENT)
Dept: PHYSICAL THERAPY | Age: 53
Discharge: HOME | End: 2018-02-13
Admitting: ORTHOPAEDIC SURGERY

## 2018-02-12 NOTE — FLOWSHEET NOTE
[facility] [employer]  Orthopaedics and Sports Rehabilitation, [facility]    Physical Therapy Daily Treatment Note  Date:  2018    Patient Name:  Evelin Aguilera    :  1965  MRN: [id number]  Restrictions/Precautions:  ACL precautions  Medical/Treatment Diagnosis Information:  · Diagnosis: I57.903N (ICD-10-CM) - Sprain of anterior cruciate ligament of left knee, sequela  · Treatment Diagnosis: Decreased ROM, decreased quad activation, increased swelling, impaired gait mechanics  · PO L Knee  MM repair (12-mm Red/White); Partial ACL tear (anterior 2/3rds intact)  · DOS: 18  · Medications: ; ibuprofen PRN  Insurance/Certification information:  PT Insurance Information: PT BENEFITS 2018 FACILITY/ HUMANA/ EFFECTIVE 18/ ACTIVE/ DED 5500/ PAYS 80%/ OOP 6550/ 40 VPCY/ 0 AUTH/ TEKO/ WSO#[id number]/ 1-15-18 PAG/PW DONE  CB/   Physician Information:  Referring Practitioner: Christophe Martines of ted signed (Y/N): Signed     Date of Patient follow up with Physician:    Patient seen in consultation with Dr. Gin Franklin who established the initial/subsequent treatment protocol.   18: ACL was only partially torn and 2/3 intact so repair was not necessary, 30% chance of full tear in the future if participates in twisting/impact activities, 25-30% PWB x 3-4 weeks then progress  18: saw MD upstairs for 3 week follow up    G-Code (if applicable):      Date G-Code Applied:  18        Progress Note: [x]  Yes  []  No  Next due by: Visit #14       Latex Allergy:  [x]NO      []YES  Preferred Language for Healthcare:   [x]English       []other:    Visit # Insurance Allowable   8 ()  2 () 40 VPCY  0 AUTH     BFR Research Study Participant   Group GROUP A: EARLY BFR @ 4 WEEKS   4 Week Post-op Strength Test/Girth Update    8 Week Post-op Strength Test/Girth Update    12 Week Post-op Strength Test/Girth Update    16 Week Post-op Strength Test/Girth Update    20 Week Post-op Strength Test/Girth Update        Pain level:  6/10 at rest; throbbing, ache    SUBJECTIVE:  Patient presents to PT today 3.5 weeks PO L Knee Medial Meniscus Repair. Doing okay today. Feels that the aching feeling on her medial knee still bothers her when she is seated for a longer period of time but notes once she gets up and moves around this improves. Only taking ibuprofen intermittently and is trying to get a compression sleeve.     OBJECTIVE:      2/9/18  ROM PROM AROM Overpressure Comment     L R L R L R     Flexion     112  137 142     Cold  ERMI   Extension     +2 +5                                               1/18/18  Strength L R Comment   Quad 2-/5  1/5 5/5  5/5 Quad Set  VMO   Hamstring      Gastroc         Hip  flexion         Hip abd                                1/18/18  Special Test Results/Comment   Meniscal Click Neg   Crepitus Neg   Flexion Test Neg   Valgus Laxity Mild on L   Varus Laxity Neg   Lachmans Pos on R   Drop Back Neg   Homans Neg   Temperature 98.6      2/12/18  Girth L R   Mid Patella 35.3 35.6   Suprapatellar 35.5 35.8   5cm above 36.3 37.3   15cm above 41.3 45.3      Reflexes/Sensation:               [x]Dermatomes/Myotomes intact               [x]Reflexes equal and normal bilaterally              []Other:     Joint mobility:                [x]Normal               []Hypo              []Hyper     Palpation: generalized PO tenderness 1/18/18     Functional Mobility/Transfers: Independent 1/18/18     Posture: WNL 1/18/18     Bandages/Dressings/Incisions: Incisions healing well; mild skin discoloration still present 2/9/18     Gait: (include devices/WB status) ambulates with TROM in place using bilateral axillary crutches with 50% WB 2/9/18     Orthopedic Special Tests: see above    Isometric Strength Testing Results Summary  Knee    On 2/12/2018 the patient, You Soto, underwent an Isometric Strength Test to evaluate current status and progress of the strengthening phase of his/her current [x] (68364 or 31874) Provided verbal/tactile cueing for activities related to improving balance, coordination, kinesthetic sense, posture, motor skill, proprioception and motor activation to allow for proper function of core, proximal hip and LE with self care and ADLs  [] (44071) Gait Re-education- Provided training and instruction to the patient for proper LE, core and proximal hip recruitment and positioning and eccentric body weight control with ambulation re-education including up and down stairs     Home Exercise Program:    [x] (77552) Reviewed/Progressed HEP activities related to strengthening, flexibility, endurance, ROM of core, proximal hip and LE for functional self-care, mobility, lifting and ambulation/stair navigation   [] (53625)Reviewed/Progressed HEP activities related to improving balance, coordination, kinesthetic sense, posture, motor skill, proprioception of core, proximal hip and LE for self care, mobility, lifting, and ambulation/stair navigation      Manual Treatments:  PROM / STM / Oscillations-Mobs:  G-I, II, III, IV (PA's, Inf., Post.)  [x] (88522) Provided manual therapy to mobilize LE, proximal hip and/or LS spine soft tissue/joints for the purpose of modulating pain, promoting relaxation,  increasing ROM, reducing/eliminating soft tissue swelling/inflammation/restriction, improving soft tissue extensibility and allowing for proper ROM for normal function with self care, mobility, lifting and ambulation. Modalities:  Ice 15' ;      Charges:  Timed Code Treatment Minutes: 61'   Total Treatment Minutes: 80'       [] EVAL (LOW) 13276 (typically 20 minutes face-to-face)  [] EVAL (MOD) 15973 (typically 30 minutes face-to-face)  [] EVAL (HIGH) 39442 (typically 45 minutes face-to-face)  [] RE-EVAL   [x] IA(16491) x  1   [] IONTO  [x] NMR (18630) x  1   [] VASO  [] Manual (33778) x       [x] Other: 1PPT  [x] TA x  1    [] Mech Traction (07091)  [] ES(attended) (78561)      [] ES (un) (57201):     GOALS:  Patient stated goal: Return to walking 1 mile at [place] for fitness     Therapist goals for Patient:   Short Term Goals: To be achieved in: 4 weeks  1. Independent in HEP and progression per patient tolerance, in order to prevent re-injury. MET  2. Patient will have a decrease in pain to <2/10 at max to facilitate improvement in movement, function, and ADLs as indicated by Functional Deficits. NOT MET  3. Patient will maintain L Knee AROM 0-135 prior to surgery. MET     Long Term Goals: To be achieved in: 12 weeks post-operatively (4/17/18)   1. Disability index score of 40% or less for the LEFS to assist with reaching prior level of function. 2. Patient will demonstrate increased AROM to 0-135 to allow for proper joint functioning as indicated by patients Functional Deficits. 3. Patient will demonstrate an increase in Strength to 4/5 or greater in BLE to allow for proper functional mobility as indicated by patients Functional Deficits. 4. Patient will be able to walk 1 mile without increase in pain or swelling. (patient specific functional goal)  5. 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 [] Progression is slowed due to complexities listed. [] Progression has been slowed due to co-morbidities. [] Plan just implemented, too soon to assess goals progression  [] Other:     ASSESSMENT:  See eval    Treatment/Activity Tolerance:  [x] Patient tolerated treatment well [] Patient limited by fatique  [] Patient limited by pain  [] Patient limited by other medical complications  [x] Other: Patient had medial joint line pain with TKE's today so PT stopped exercise after 10 reps. She performed an isometric biodex strength test today which demonstrated she has significant quad weakness on L LE and bilateral hamstring weakness.  These numbers are expected at 4 weeks PO and all results were explained to patient this visit. Able to do 2# for all exercises today without any issues. Still fatigues after 20 mini-squats. Patient still requires further therapy to address decreased ROM, decreased strength, increased pain, increased swelling and impaired gait mechanics.      Prognosis: [x] Good [] Fair  [] Poor    Patient Requires Follow-up: [x] Yes  [] No    PLAN: See eval  [] Continue per plan of care [] Alter current plan (see comments)  [x] Plan of care initiated [] Hold pending MD visit [] Discharge    Functional ACL brace at 8-12 weeks PO    BFR  10-rep max and first session NPV  Add wall sit  Increase to 75% PWB    Electronically signed by: Brennan Chi PT, DPT

## 2018-02-14 ENCOUNTER — HOSPITAL ENCOUNTER (OUTPATIENT)
Dept: PHYSICAL THERAPY | Age: 53
Discharge: HOME | End: 2018-02-15
Admitting: ORTHOPAEDIC SURGERY

## 2018-02-14 NOTE — FLOWSHEET NOTE
[facility]  Orthopaedics and Sports Rehabilitation, [facility]    Physical Therapy Daily Treatment Note  Date:  2018    Patient Name:  Chey Nolasco    :  1965  MRN: [id number]  Restrictions/Precautions:  ACL precautions  Medical/Treatment Diagnosis Information:  · Diagnosis: W76.837E (ICD-10-CM) - Sprain of anterior cruciate ligament of left knee, sequela  · Treatment Diagnosis: Decreased ROM, decreased quad activation, increased swelling, impaired gait mechanics  · PO L Knee  MM repair (12-mm Red/White); Partial ACL tear (anterior 2/3rds intact)  · DOS: 18  · Medications: ; ibuprofen PRN  Insurance/Certification information:  PT Insurance Information: PT BENEFITS 2018 FACILITY/ HUMANA/ EFFECTIVE 18/ ACTIVE/ DED 5500/ PAYS 80%/ OOP 6550/ 40 VPCY/ 0 AUTH/ TEKO/ ZVM#[id number]/ 1-15-18 PAG/PW DONE  CB/   Physician Information:  Referring Practitioner: Mehdi Razo of care signed (Y/N): Signed     Date of Patient follow up with Physician:    Patient seen in consultation with Dr. Jaime Burns who established the initial/subsequent treatment protocol.   18: ACL was only partially torn and 2/3 intact so repair was not necessary, 30% chance of full tear in the future if participates in twisting/impact activities, 25-30% PWB x 3-4 weeks then progress  18: saw MD upstairs for 3 week follow up    G-Code (if applicable):      Date G-Code Applied:  18        Progress Note: [x]  Yes  []  No  Next due by: Visit #14       Latex Allergy:  [x]NO      []YES  Preferred Language for Healthcare:   [x]English       []other:    Visit # Insurance Allowable    ()  2 () 40 VPCY  0 AUTH     BFR Research Study Participant   Group GROUP A: EARLY BFR @ 4 WEEKS   4 Week Post-op Strength Test/Girth Update    8 Week Post-op Strength Test/Girth Update    12 Week Post-op Strength Test/Girth Update    16 Week Post-op Strength Test/Girth Update    20 Week Post-op Strength Test/Girth (10380)  [] ES(attended) (28200)      [] ES (un) (97623):     GOALS:  Patient stated goal: Return to walking 1 mile at [place] for fitness     Therapist goals for Patient:   Short Term Goals: To be achieved in: 4 weeks  1. Independent in HEP and progression per patient tolerance, in order to prevent re-injury. MET  2. Patient will have a decrease in pain to <2/10 at max to facilitate improvement in movement, function, and ADLs as indicated by Functional Deficits. NOT MET  3. Patient will maintain L Knee AROM 0-135 prior to surgery. MET     Long Term Goals: To be achieved in: 12 weeks post-operatively (4/17/18)   1. Disability index score of 40% or less for the LEFS to assist with reaching prior level of function. 2. Patient will demonstrate increased AROM to 0-135 to allow for proper joint functioning as indicated by patients Functional Deficits. 3. Patient will demonstrate an increase in Strength to 4/5 or greater in BLE to allow for proper functional mobility as indicated by patients Functional Deficits. 4. Patient will be able to walk 1 mile without increase in pain or swelling. (patient specific functional goal)  5. 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 [] Progression is slowed due to complexities listed. [] Progression has been slowed due to co-morbidities. [] Plan just implemented, too soon to assess goals progression  [] Other:     ASSESSMENT:  See eval    Treatment/Activity Tolerance:  [x] Patient tolerated treatment well [] Patient limited by fatique  [] Patient limited by pain  [] Patient limited by other medical complications  [x] Other: Patient had her first BFR session today which significantly fatigued her muscles, specifically her quads.  Once completed, PT had patient perform weight shifting to 75% but did not initiate gait training at this amount and will

## 2018-02-19 ENCOUNTER — HOSPITAL ENCOUNTER (OUTPATIENT)
Dept: PHYSICAL THERAPY | Age: 53
Discharge: HOME | End: 2018-02-20
Admitting: ORTHOPAEDIC SURGERY

## 2018-02-23 ENCOUNTER — HOSPITAL ENCOUNTER (OUTPATIENT)
Dept: PHYSICAL THERAPY | Age: 53
Discharge: HOME | End: 2018-02-24
Admitting: ORTHOPAEDIC SURGERY

## 2018-02-23 NOTE — FLOWSHEET NOTE
Test/Girth Update        Pain level:  4/10 at rest     SUBJECTIVE:  Patient presents to PT today 5.5 weeks PO L Knee Medial Meniscus Repair. Reports that following Monday's session of BFR she had a high amount of throbbing, aching pain within joint on both medial and lateral side of knee that lasted for 3 days. She rates pain as high as 9/10 and required her to take percocet again to help manage. Did not notice any increase in her swelling but does report her knee feels tight today.     OBJECTIVE:      2/23/18  ROM PROM AROM Overpressure Comment     L R L R L R     Flexion     112  138 142     Cold  ERMI   Extension     +2 +5                                               2/14/18  Strength L R Comment   Quad 3/5  2/5 5/5  5/5 Quad Set  VMO   Hamstring      Gastroc         Hip  flexion         Hip abd                                1/18/18  Special Test Results/Comment   Meniscal Click Neg   Crepitus Neg   Flexion Test Neg   Valgus Laxity Mild on L   Varus Laxity Neg   Lachmans Pos on R   Drop Back Neg   Homans Neg   Temperature 98.6      2/23/18  Girth L R   Mid Patella 34.8 35.6   Suprapatellar 35.5 35.8   5cm above 35.8 37.3   15cm above 41.3 45.3      Reflexes/Sensation:               [x]Dermatomes/Myotomes intact               [x]Reflexes equal and normal bilaterally              []Other:     Joint mobility:                [x]Normal               []Hypo              []Hyper     Palpation: generalized PO tenderness 1/18/18     Functional Mobility/Transfers: Independent 1/18/18     Posture: WNL 1/18/18     Bandages/Dressings/Incisions: Incisions healing well; mild skin discoloration still present 2/9/18     Gait: (include devices/WB status) ambulates with TROM in place using bilateral axillary crutches with 50% WB 2/9/18     Orthopedic Special Tests: see above    Isometric Strength Testing Results Summary  Knee    On 2/12/2018 the patient, Edgar Conner, underwent an Isometric Strength Test to evaluate current Flexion     Leg Press      Cable Column               Balance       Tilt board       SLS      Biodex balance               Other       Bike       Treadmill       Gait Training 5' [employer] walking, single crutch        Manual Interventions              Patient Education:   12/6/17: Patient educated about PT diagnosis, prognosis, and plan of care; educated on role of physical therapy; educated on HEP; educated on anatomy of ACL and mensicus; on activities to avoid; on importance of maintenance of ROM pre-operatively. 1/16/18: Educated patient on all pre-op and post-op instructions including but not limited to R.I.C.E., post-op HEP, DVT prevention, warning signs of infection and DVT, possible WB restrictions. 1/18/18: Full PO instructions including WB status and use of TROM  1/23/18: education re: quad stretch  2/6/18: educated patient re: 50% PWB and unlocking TROM to 90 degrees  2/23/18:  Educated patient re: 75% PWB and use of single crutch around home    HEP:Patient instructed on HEP on this date with handout provided and all questions answered. Patient was instructed to contact PT with any complications or questions about HEP moving forward. Patient verbally stated she understood. Updated 1/23/18    Therapeutic Exercise and NMR EXR  [x] (57713) Provided verbal/tactile cueing for activities related to strengthening, flexibility, endurance, ROM for improvements in LE, proximal hip, and core control with self care, mobility, lifting, ambulation.  [] (63496) Provided verbal/tactile cueing for activities related to improving balance, coordination, kinesthetic sense, posture, motor skill, proprioception  to assist with LE, proximal hip, and core control in self care, mobility, lifting, ambulation and eccentric single leg control.      NMR and Therapeutic Activities:    [x] (45751 or 13815) Provided verbal/tactile cueing for activities related to improving balance, coordination, kinesthetic sense, posture, motor skill, of patella with LAQ. Improved with patellar mobs. Education re: home patellar mobs. Patient still requires further therapy to address decreased ROM, decreased strength, increased pain, increased swelling and impaired gait mechanics.      Prognosis: [x] Good [] Fair  [] Poor    Patient Requires Follow-up: [x] Yes  [] No    PLAN: See eval  [] Continue per plan of care [] Alter current plan (see comments)  [x] Plan of care initiated [] Hold pending MD visit [] Discharge    Functional ACL brace at 8-12 weeks PO  Gait training at [facility]    Electronically signed by: Meagan Butts PT, DPT

## 2018-02-27 ENCOUNTER — HOSPITAL ENCOUNTER (OUTPATIENT)
Dept: PHYSICAL THERAPY | Age: 53
Discharge: HOME | End: 2018-02-28
Admitting: ORTHOPAEDIC SURGERY

## 2018-02-27 NOTE — FLOWSHEET NOTE
Test/Girth Update    16 Week Post-op Strength Test/Girth Update    20 Week Post-op Strength Test/Girth Update        Pain level:  0/10 at rest, 4/10 worst     SUBJECTIVE:  Patient presents to PT today 6 weeks PO L Knee Medial Meniscus Repair. Doing much better this visit. No increase in pain or soreness after PT Friday but did feel mild fatigue. Has been working on her patellar mobility on her own but still feels catching at times with knee flexion/extension.     OBJECTIVE:      2/27/18   ROM PROM AROM Overpressure Comment     L R L R L R     Flexion     127  140 142     Cold  ERMI   Extension     +4 +5                                               2/14/18  Strength L R Comment   Quad 3/5  2/5 5/5  5/5 Quad Set  VMO   Hamstring      Gastroc         Hip  flexion         Hip abd                                1/18/18  Special Test Results/Comment   Meniscal Click Neg   Crepitus Neg   Flexion Test Neg   Valgus Laxity Mild on L   Varus Laxity Neg   Lachmans Pos on R   Drop Back Neg   Homans Neg   Temperature 98.6      2/27/18   Girth L R   Mid Patella 34.8 35.5   Suprapatellar 35.4 35.3   5cm above 35.8 36.8   15cm above 41.3 45.3      Reflexes/Sensation:               [x]Dermatomes/Myotomes intact               [x]Reflexes equal and normal bilaterally              []Other:     Joint mobility:                [x]Normal               []Hypo              []Hyper     Palpation: no tenderness to palpation 2/27/18     Functional Mobility/Transfers: Independent 1/18/18     Posture: WNL 1/18/18     Bandages/Dressings/Incisions: Incisions healing well; mild skin discoloration still present 2/9/18     Gait: (include devices/WB status) ambulates with TROM in place using single axillary crutch with 75% [facility] [facility] 2/27/18     Orthopedic Special Tests: see above    Isometric Strength Testing Results Summary  Knee    On 2/12/2018 the patient, Lynn Tomlinson, underwent an Isometric Strength Test to evaluate current status and progress of the strengthening phase of his/her current rehabilitation program.  She is currently being treated for L Knee Medial Meniscus Repair (now 4 weeks PO). Attached you will find a computer generated summary of the results which outlines the current status. To summarize these results you will find that Edgar Conner underwent a test measuring the strength of the knee Quadriceps and Hamstrings muscle groups at the isometric angle 60 degrees. Standard protocol of testing is to provide pre-test stretching and warm up of both extremities followed by instruction in the test procedure and \"practice\" repetitions prior to each actual test session. The uninjured extremity undergoes the test procedure first followed by the injured extremity. Bilateral Difference:  Quadricep  61.5% [x] Deficit   [] Surplus   Hamstring  44.1% [x] Deficit   [] Surplus     Normative Data:  Quadricep Normal: 60% Patient: 23.1%   Hamstring Normal: 36% Patient: 8.3%       RESTRICTIONS/PRECAUTIONS: L Knee MMR protocol; 25% PWB x 3-4 weeks (start 1/18/18);  ACL precautions    Exercises/Interventions:  Exercise/Equipment Resistance/Repetitions Other comments   Stretching       Hamstring 5 x 30\" Prop   Hip Flexion     ITB     Figure 4     Inclined Calf     Towel Pull 5 x 30\" Prop           ROM       Active     Weight Shift     Hang Weights     Sheet Pulls     Recumbent Bike 6' AAROM   ERMI 5 x 1'            Patellar Glides       Medial  3'     Superior  3'     Inferior  3'             Isometrics                        SLR       Supine 3 x 10 2#   Prone 3 x 10 2#   Abduction 3 x 10 2#   Adducton     SLR+               Glutes       Bridges     Supine Clams     Side Stepping       Monster walks               CKC       Calf raises 3 x 10 [employer] % WB   Wall sits     Step ups       Squatting 3 x 10 Mini Squats   SL DL               PRE       Extension 3 x 10 2#   Flexion     Leg Press      Cable Column               Balance       Tilt board       SLS     [employer] Vocabulary balance 4' PS Level 8 Color [place]           Other       Bike       Treadmill            Manual Interventions              Patient Education:   12/6/17: Patient educated about PT diagnosis, prognosis, and plan of care; educated on role of physical therapy; educated on HEP; educated on anatomy of ACL and mensicus; on activities to avoid; on importance of maintenance of ROM pre-operatively. 1/16/18: Educated patient on all pre-op and post-op instructions including but not limited to R.I.C.E., post-op HEP, DVT prevention, warning signs of infection and DVT, possible WB restrictions. 1/18/18: Full PO instructions including WB status and use of TROM  1/23/18: education re: quad stretch  2/6/18: educated patient re: 50% PWB and unlocking TROM to 90 degrees  2/23/18:  Educated patient re: 75% PWB and use of single crutch around home    HEP:Patient instructed on HEP on this date with handout provided and all questions answered. Patient was instructed to contact PT with any complications or questions about HEP moving forward. Patient verbally stated she understood. Updated 1/23/18    Therapeutic Exercise and NMR EXR  [x] (72581) Provided verbal/tactile cueing for activities related to strengthening, flexibility, endurance, ROM for improvements in LE, proximal hip, and core control with self care, mobility, lifting, ambulation.  [] (95900) Provided verbal/tactile cueing for activities related to improving balance, coordination, kinesthetic sense, posture, motor skill, proprioception  to assist with LE, proximal hip, and core control in self care, mobility, lifting, ambulation and eccentric single leg control.      NMR and Therapeutic Activities:    [x] (26466 or 42825) Provided verbal/tactile cueing for activities related to improving balance, coordination, kinesthetic sense, posture, motor skill, proprioception and motor activation to allow for proper function of core, proximal hip and LE with self care and ADLs  [] (99153) Gait Re-education- Provided training and instruction to the patient for proper LE, core and proximal hip recruitment and positioning and eccentric body weight control with ambulation re-education including up and down stairs     Home Exercise Program:    [x] (62669) Reviewed/Progressed HEP activities related to strengthening, flexibility, endurance, ROM of core, proximal hip and LE for functional self-care, mobility, lifting and ambulation/stair navigation   [] (88062)Reviewed/Progressed HEP activities related to improving balance, coordination, kinesthetic sense, posture, motor skill, proprioception of core, proximal hip and LE for self care, mobility, lifting, and ambulation/stair navigation      Manual Treatments:  PROM / STM / Oscillations-Mobs:  G-I, II, III, IV (PA's, Inf., Post.)  [x] (52551) Provided manual therapy to mobilize LE, proximal hip and/or LS spine soft tissue/joints for the purpose of modulating pain, promoting relaxation,  increasing ROM, reducing/eliminating soft tissue swelling/inflammation/restriction, improving soft tissue extensibility and allowing for proper ROM for normal function with self care, mobility, lifting and ambulation. Modalities: Ice15'    Charges:  Timed Code Treatment Minutes: 61'   Total Treatment Minutes: 80'       [] EVAL (LOW) 455 1011 (typically 20 minutes face-to-face)  [] EVAL (MOD) 09163 (typically 30 minutes face-to-face)  [] EVAL (HIGH) 19406 (typically 45 minutes face-to-face)  [] RE-EVAL   [x] EJ(21524) x  1   [] IONTO  [x] NMR (98871) x  1   [] VASO  [x] Manual (68276) x  1    [] Other: 1PPT  [x] TA x  1    [] Mech Traction (67813)  [] ES(attended) (38146)      [] ES (un) (39141):     GOALS:  Patient stated goal: Return to walking 1 mile at park [employer]     Therapist goals for Patient:   Short Term Goals: To be achieved in: 4 weeks  1. Independent in HEP and progression per patient tolerance, in order to prevent re-injury. MET  2.  Patient

## 2018-03-01 ENCOUNTER — HOSPITAL ENCOUNTER (OUTPATIENT)
Dept: PHYSICAL THERAPY | Age: 53
Discharge: HOME | End: 2018-03-31
Attending: ORTHOPAEDIC SURGERY | Admitting: ORTHOPAEDIC SURGERY

## 2018-03-02 ENCOUNTER — HOSPITAL ENCOUNTER (OUTPATIENT)
Dept: PHYSICAL THERAPY | Age: 53
Discharge: HOME | End: 2018-03-03
Admitting: ORTHOPAEDIC SURGERY

## 2018-03-02 NOTE — PLAN OF CARE
to address decreased ROM, decreased strength, increased pain, increased swelling and impaired gait mechanics. Date range of Visits: 18-3/2/18  Total Visits: 12    Physical Therapy Daily Treatment Note  Date:  3/2/2018    Patient Name:  Mary Ann Harris    :  1965  MRN: [id number]  Restrictions/Precautions:  ACL precautions  Medical/Treatment Diagnosis Information:  · Diagnosis: T47.137A (ICD-10-CM) - Sprain of anterior cruciate ligament of left knee, sequela  · Treatment Diagnosis: Decreased ROM, decreased quad activation, increased swelling, impaired gait mechanics  · PO L Knee  MM repair (12-mm Red/White); Partial ACL tear (anterior 2/3rds intact)  · DOS: 18  · Medications: ; ibuprofen PRN  Insurance/Certification information:  PT Insurance Information: PT BENEFITS 2018 FACILITY/ HUMANA/ EFFECTIVE 18/ ACTIVE/ DED 5500/ PAYS 80%/ OOP 6550/ 40 VPCY/ 0 AUTH/ TEKO/ REE#[id number]/ 1-15-18 PAG/PW DONE  CB/   Physician Information:  Referring Practitioner: Sushil Cano of care signed (Y/N): Signed     Date of Patient follow up with Physician:  12  Patient seen in consultation with Dr. Grace West who established the initial/subsequent treatment protocol. 18: ACL was only partially torn and 2/3 intact so repair was not necessary, 30% chance of full tear in the future if participates in twisting/impact activities, 25-30% PWB x 3-4 weeks then progress  18: saw MD upstairs for 3 week follow up  18: sensitive vascular system, d/c neurontin, discontinue BFR due to poor venous return    G-Code (if applicable):      Date G-Code Applied:  3/2/18  PT G-Codes  Functional Assessment Tool Used: LEFS  Score: 72% deficit  Functional Limitation: Mobility: Walking and moving around  Mobility: Walking and Moving Around Current Status (): At least 60 percent but less than 80 percent impaired, limited or restricted  Mobility: Walking and Moving Around Goal Status ():  At least 1 percent but less than 20 percent impaired, limited or restricted    Progress Note: [x]  Yes  []  No  Next due by: Visit #22       Latex Allergy:  [x]NO      []YES  Preferred Language for Healthcare:   [x]English       []other:    Visit # Insurance Allowable   12 40 VPCY  0 AUTH     BFR Research Study Participant   Group GROUP A: EARLY BFR @ 4 WEEKS   4 Week Post-op Strength Test/Girth Update 2/12/18   8 Week Post-op Strength Test/Girth Update    12 Week Post-op Strength Test/Girth Update    16 Week Post-op Strength Test/Girth Update    20 Week Post-op Strength Test/Girth Update        Pain level:  2/10 best, 8/10 worst     SUBJECTIVE:  Patient presents to PT today 6+ weeks PO L Knee Medial Meniscus Repair. Doing okay today, is slightly more achy/sore from being on/off feet at work today. She has noticed her leg turns purple within 5 minutes of being in a standing/gravity dependent position and the longer she stays there, her leg gets more purple and becomes cold as well as numb down her leg so she returns to a long sitting/propped position and rubs legs and takes about 3-4 minutes for normal color to return. Still has mild aching on medial and lateral side of knees that ranges from 2/10 to 8/10 in intensity.      LEFS:   1/16/18: 75% Deficit (pre-op)   1/18/18: 100% Deficit (Post-op)   3/2/18: 72% Deficit    OBJECTIVE:      3/2/18   ROM PROM AROM Overpressure Comment     L R L R L R     Flexion     127  140 142     Cold  ERMI   Extension     +4 +5                                               3/2/18  Strength L R Comment   Quad 4-/5  3/5 5/5  5/5 Quad Set  VMO   Hamstring      Gastroc         Hip  flexion         Hip abd                                1/18/18  Special Test Results/Comment   Meniscal Click Neg   Crepitus Neg   Flexion Test Neg   Valgus Laxity Mild on L   Varus Laxity Neg   Lachmans Pos on R   Drop Back Neg   Homans Neg   Temperature 98.6      3/2/18   Girth L R   Mid Patella 35.2 35.5   Suprapatellar 35.7 expected towards functional goals listed. [] Progression is slowed due to complexities listed. [] Progression has been slowed due to co-morbidities.   [] Plan just implemented, too soon to assess goals progression  [] Other:     ASSESSMENT:  See above    Treatment/Activity Tolerance:  [x] Patient tolerated treatment well [] Patient limited by fatique  [] Patient limited by pain  [] Patient limited by other medical complications  [x] Other:     Prognosis: [x] Good [] Fair  [] Poor    Patient Requires Follow-up: [x] Yes  [] No    PLAN: See eval  [] Continue per plan of care [] Alter current plan (see comments)  [x] Plan of care initiated [] Hold pending MD visit [] Discharge    Functional ACL brace at 8-12 weeks PO    Add DL Leg Press    Electronically signed by: Nahed Starks PT, DPT

## 2018-03-23 ENCOUNTER — HOSPITAL ENCOUNTER (OUTPATIENT)
Dept: PHYSICAL THERAPY | Age: 53
Discharge: HOME | End: 2018-03-24
Admitting: ORTHOPAEDIC SURGERY

## 2018-03-23 NOTE — FLOWSHEET NOTE
[facility]  Orthopaedics and Sports Rehabilitation, [facility]    Physical Therapy Daily Treatment Note  Date:  3/23/2018    Patient Name:  Pee Perkins    :  1965  MRN: [id number]  Restrictions/Precautions:  ACL precautions  Medical/Treatment Diagnosis Information:  · Diagnosis: B78.745E (ICD-10-CM) - Sprain of anterior cruciate ligament of left knee, sequela  · Treatment Diagnosis: Decreased ROM, decreased quad activation, increased swelling, impaired gait mechanics  · PO L Knee  MM repair (12-mm Red/White); Partial ACL tear (anterior 2/3rds intact)  · DOS: 18  · Medications: ; ibuprofen PRN  Insurance/Certification information:  PT Insurance Information: PT BENEFITS 2018 FACILITY/ HUMANA/ EFFECTIVE 18/ ACTIVE/ DED 5500/ PAYS 80%/ OOP 6550/ 40 VPCY/ 0 AUTH/ TEKO/ JBC#[id number]/ 1-15-18 PAG/PW DONE  CB/   Physician Information:  Referring Practitioner: Sarah Beth Kelyl of care signed (Y/N): Signed     Date of Patient follow up with Physician:  12  Patient seen in consultation with Dr. Livia Rice who established the initial/subsequent treatment protocol.   18: ACL was only partially torn and 2/3 intact so repair was not necessary, 30% chance of full tear in the future if participates in twisting/impact activities, 25-30% PWB x 3-4 weeks then progress  18: saw MD upstairs for 3 week follow up  18: sensitive vascular system, d/c neurontin, discontinue BFR due to poor venous return    G-Code (if applicable):      Date G-Code Applied:  3/2/18       Progress Note: [x]  Yes  []  No  Next due by: Visit #22       Latex Allergy:  [x]NO      []YES  Preferred Language for Healthcare:   [x]English       []other:    Visit # Insurance Allowable   12 40 VPCY  0 AUTH     BFR Research Study Participant   Group GROUP A: EARLY BFR @ 4 WEEKS   4 Week Post-op Strength Test/Girth Update 18   8 Week Post-op Strength Test/Girth Update NT: patient fell, broke ribs   12 Week Post-op Strength Test/Girth Update    16 Week Post-op Strength Test/Girth Update    20 Week Post-op Strength Test/Girth Update        Pain level:  0/10 today in knee     SUBJECTIVE:  Patient presents to PT today 9.5  weeks PO L Knee Medial Meniscus Repair. Doing well today. Reports that two weeks ago, she was on her feet a lot for her mother's  and the next day, her knee buckled on her when going down a flight of stairs. She fell down a full flight of stairs causing her to fracture multiple ribs and get a concussion. She reports her knee did not get hurt from the fall and has not bothered her at all in past two weeks. She has since recovered, but still is very sore on her left side / left UE.      LEFS:   18: 75% Deficit (pre-op)   18: 100% Deficit (Post-op)   3/2/18: 72% Deficit    OBJECTIVE:      3/23/18   ROM PROM AROM Overpressure Comment     L R L R L R     Flexion     135  140 142     Cold  ERMI   Extension     +4 +5                                               3/2/18  Strength L R Comment   Quad 4-/5  3/5 5/5  5/5 Quad Set  VMO   Hamstring      Gastroc         Hip  flexion         Hip abd                                18  Special Test Results/Comment   Meniscal Click Neg   Crepitus Neg   Flexion Test Neg   Valgus Laxity Mild on L   Varus Laxity Neg   Lachmans Pos on R   Drop Back Neg   Homans Neg   Temperature 98.6      3/23/18   Girth L R   Mid Patella 33.5 35.5   Suprapatellar 34.4 35.3   5cm above 35.8 36.8   15cm above 41.3 45.3      Reflexes/Sensation:               [x]Dermatomes/Myotomes intact               [x]Reflexes equal and normal bilaterally              []Other:     Joint mobility:                [x]Normal               []Hypo              []Hyper     Palpation: Mild tenderness over medial joint line 3/2/18     Functional Mobility/Transfers: Independent 18     Posture: WNL 18     Bandages/Dressings/Incisions: Incisions healing well; mild skin discoloration still present 2/9/18     Gait: (include devices/WB status) ambulates with TROM in place without crutches and FWB; has slight antalgic gait and slow gait angella due to rib fractures 3/23/18     Orthopedic Special Tests: see above    Isometric Strength Testing Results Summary  Knee    On 2/12/2018 the patient, Lul Colunga, underwent an Isometric Strength Test to evaluate current status and progress of the strengthening phase of his/her current rehabilitation program.  She is currently being treated for L Knee Medial Meniscus Repair (now 4 weeks PO). Attached you will find a computer generated summary of the results which outlines the current status. To summarize these results you will find that Lul Colunga underwent a test measuring the strength of the knee Quadriceps and Hamstrings muscle groups at the isometric angle 60 degrees. Standard protocol of testing is to provide pre-test stretching and warm up of both extremities followed by instruction in the test procedure and \"practice\" repetitions prior to each actual test session. The uninjured extremity undergoes the test procedure first followed by the injured extremity.       Bilateral Difference:  Quadricep  61.5% [x] Deficit   [] Surplus   Hamstring  44.1% [x] Deficit   [] Surplus     Normative Data:  Quadricep Normal: 60% Patient: 23.1%   Hamstring Normal: 36% Patient: 8.3%       RESTRICTIONS/PRECAUTIONS: L Knee MMR protocol;  ACL precautions    Exercises/Interventions:  Exercise/Equipment Resistance/Repetitions Other comments   Stretching       Hamstring 5 x 30\" Prop   Hip Flexion     ITB 5 x 30\" Standing   Figure 4     Inclined Calf 5 x 30\"            ROM       Active     Weight Shift     Hang Weights     Sheet Pulls     Recumbent Bike 10' Level 1.1              Patellar Glides                           Isometrics                        SLR        Unable d/t ribsUnable d/t ribsAbduction x15    Adducton     SLR+               Glutes       Bridges (73353 or ) Provided verbal/tactile cueing for activities related to improving balance, coordination, kinesthetic sense, posture, motor skill, proprioception and motor activation to allow for proper function of core, proximal hip and LE with self care and ADLs  [] (18733) Gait Re-education- Provided training and instruction to the patient for proper LE, core and proximal hip recruitment and positioning and eccentric body weight control with ambulation re-education including up and down stairs     Home Exercise Program:    [x] (25229) Reviewed/Progressed HEP activities related to strengthening, flexibility, endurance, ROM of core, proximal hip and LE for functional self-care, mobility, lifting and ambulation/stair navigation   [] (88741)Reviewed/Progressed HEP activities related to improving balance, coordination, kinesthetic sense, posture, motor skill, proprioception of core, proximal hip and LE for self care, mobility, lifting, and ambulation/stair navigation      Manual Treatments:  PROM / STM / Oscillations-Mobs:  G-I, II, III, IV (PA's, Inf., Post.)  [x] (06324) Provided manual therapy to mobilize LE, proximal hip and/or LS spine soft tissue/joints for the purpose of modulating pain, promoting relaxation,  increasing ROM, reducing/eliminating soft tissue swelling/inflammation/restriction, improving soft tissue extensibility and allowing for proper ROM for normal function with self care, mobility, lifting and ambulation.      Modalities: Ice 15'     Charges:  Timed Code Treatment Minutes: 40'   Total Treatment Minutes: 54'       [] EVAL (LOW) 63290 (typically 20 minutes face-to-face)  [] EVAL (MOD) 97264 (typically 30 minutes face-to-face)  [] EVAL (HIGH) 66411 (typically 45 minutes face-to-face)  [] RE-EVAL   [x] LW(34564) x  1   [] IONTO  [x] NMR (88845) x  1   [] VASO  [] Manual (93024) x       [] Other: 1PPT  [x] TA x  1    [] Mech Traction (92649)  [] ES(attended) (11664)      [] ES (un) (02129): GOALS:  Patient stated goal: Return to walking 1 mile at [employer]     Therapist goals for Patient:   Short Term Goals: To be achieved in: 4 weeks  1. Independent in HEP and progression per patient tolerance, in order to prevent re-injury. MET  2. Patient will have a decrease in pain to <2/10 at max to facilitate improvement in movement, function, and ADLs as indicated by Functional Deficits. NOT MET  3. Patient will maintain L Knee AROM 0-135 prior to surgery. MET     Long Term Goals: To be achieved in: 12 weeks post-operatively (4/17/18)   1. Disability index score of 40% or less for the LEFS to assist with reaching prior level of function. NOT MET  2. Patient will demonstrate increased AROM to 0-135 to allow for proper joint functioning as indicated by patients Functional Deficits. MET  3. Patient will demonstrate an increase in Strength to 4/5 or greater in BLE to allow for proper functional mobility as indicated by patients Functional Deficits. NOT MET  4. Patient will be able to walk 1 mile without increase in pain or swelling. (patient specific functional goal)NOT MET  5. 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 [] Progression is slowed due to complexities listed. [x] Progression has been slowed due to co-morbidities.: fell and fractured left ribs 3 and 4  [] Plan just implemented, too soon to assess goals progression  [] Other:     ASSESSMENT:  Patient limited today by continued pain in her left side as her rib fractures at left ribs 3 and 4 continue to heel. Exercises are limited at this time as many positions causing patient to have pain. PT will decrease frequency to 1x/week until patient is able to participate more in strength/balance program. Patient is agreeable to this plan.  Patient still requires further therapy to address decreased strength, decreased balance, and impaired gait mechanics.        Treatment/Activity Tolerance:  [] Patient tolerated treatment well [] Patient limited by fatique  [x] Patient limited by pain  [] Patient limited by other medical complications  [x] Other:     Prognosis: [x] Good [] Fair  [] Poor    Patient Requires Follow-up: [x] Yes  [] No    PLAN: See eval  [] Continue per plan of care [] Alter current plan (see comments)  [x] Plan of care initiated [] Hold pending MD visit [] Discharge    Functional ACL brace at 8-12 weeks PO    Add DL Leg Press    Electronically signed by: Ira Mendez PT, DPT

## 2018-04-01 ENCOUNTER — HOSPITAL ENCOUNTER (OUTPATIENT)
Dept: PHYSICAL THERAPY | Age: 53
Discharge: HOME | End: 2018-04-30
Attending: ORTHOPAEDIC SURGERY | Admitting: ORTHOPAEDIC SURGERY

## 2018-11-13 ENCOUNTER — OFFICE VISIT (OUTPATIENT)
Dept: ORTHOPEDIC SURGERY | Age: 53
End: 2018-11-13
Payer: COMMERCIAL

## 2018-11-13 VITALS
HEIGHT: 64 IN | WEIGHT: 145 LBS | SYSTOLIC BLOOD PRESSURE: 103 MMHG | BODY MASS INDEX: 24.75 KG/M2 | DIASTOLIC BLOOD PRESSURE: 73 MMHG | HEART RATE: 67 BPM

## 2018-11-13 DIAGNOSIS — Z98.890 HISTORY OF LEFT KNEE SURGERY: ICD-10-CM

## 2018-11-13 DIAGNOSIS — M25.562 LEFT KNEE PAIN, UNSPECIFIED CHRONICITY: Primary | ICD-10-CM

## 2018-11-13 PROCEDURE — 99214 OFFICE O/P EST MOD 30 MIN: CPT | Performed by: ORTHOPAEDIC SURGERY

## 2018-11-13 PROCEDURE — 20610 DRAIN/INJ JOINT/BURSA W/O US: CPT | Performed by: ORTHOPAEDIC SURGERY

## 2018-11-13 NOTE — PROGRESS NOTES
Marlan Crigler. All counseling during the appointment was performed between the patient and Dr. Marlan Crigler. 11/13/18 4:28 PM   Nash Love ATC    This dictation was performed with a verbal recognition program (DRAGON) and it was checked for errors. It is possible that there are still dictated errors within this office note. If so, please bring any errors to my attention for an addendum. All efforts were made to ensure that this office note is accurate. This dictation was performed with a verbal recognition program (DRAGON) and it was checked for errors. It is possible that there are still dictated errors within this office note. If so, please bring any errors to my attention for an addendum. All efforts were made to ensure that this office note is accurate. Supervising Physician Attestation:  I, Dr. Marlan Crigler, personally performed the services described in this documentation as scribed above, and it is both accurate and complete and I agree with all pertinent clinical information. I personally interviewed the patient and performed a physical examination and medical decision making. I discussed the patient's condition and treatment options and have  reviewed and agree with the past medical, family and social history unless otherwise noted. All of the patient's questions were answered.       Board Certified Orthopaedic Surgeon  [facility] and [address] [place] [address] and [address] [facility] [employer]  Professor of Samantha Peoples

## 2018-11-15 DIAGNOSIS — S83.242A TEAR OF MEDIAL MENISCUS OF LEFT KNEE, UNSPECIFIED TEAR TYPE, UNSPECIFIED WHETHER OLD OR CURRENT TEAR, INITIAL ENCOUNTER: Primary | ICD-10-CM

## 2018-11-27 ENCOUNTER — OFFICE VISIT (OUTPATIENT)
Dept: ORTHOPEDIC SURGERY | Age: 53
End: 2018-11-27
Payer: COMMERCIAL

## 2018-11-27 VITALS
DIASTOLIC BLOOD PRESSURE: 79 MMHG | HEIGHT: 64 IN | WEIGHT: 145 LBS | BODY MASS INDEX: 24.75 KG/M2 | SYSTOLIC BLOOD PRESSURE: 129 MMHG | HEART RATE: 75 BPM

## 2018-11-27 DIAGNOSIS — M25.562 LEFT KNEE PAIN, UNSPECIFIED CHRONICITY: Primary | ICD-10-CM

## 2018-11-27 PROCEDURE — MISC250 COMPRESSION STOCKING: Performed by: ORTHOPAEDIC SURGERY

## 2018-11-27 PROCEDURE — 99213 OFFICE O/P EST LOW 20 MIN: CPT | Performed by: ORTHOPAEDIC SURGERY

## 2018-11-27 NOTE — PROGRESS NOTES
tablet Take 1-2 tablets by mouth every 4 hours as needed for Pain . 20 tablet 0    b complex vitamins capsule Take 1 capsule by mouth daily      butalbital-acetaminophen-caffeine (FIORICET, ESGIC) -40 MG per tablet Take 1 tablet by mouth every 4 hours as needed for Headaches      Topiramate (TOPAMAX PO) Take 50 mg by mouth 3 times daily      FLUoxetine (PROZAC) 40 MG capsule Take 40 mg by mouth daily.  cetirizine (ZYRTEC) 10 MG tablet Take 10 mg by mouth daily.  multivitamin (THERAGRAN) per tablet Take 1 tablet by mouth daily. No current facility-administered medications for this visit. Allergies   Allergen Reactions    Sulfa Antibiotics Nausea And Vomiting       Review of Systems:  A 14 point review of systems was completed by the patient and is available in the media section of the scanned medical record and was reviewed on 11/27/2018. The review is negative with the exception of those things mentioned in the HPI and Past Medical History     Vital Signs:   /79   Pulse 75   Ht 5' 4\" (1.626 m)   Wt 145 lb (65.8 kg)   BMI 24.89 kg/m²     General Exam:   Mental Status: The patient is oriented to time, place and person. The patient's mood and affect are appropriate. Neurological: The patient has good coordination. There is no weakness or sensory deficit. Knee Examination: left     Skin:  There are no skin lesions, cellulitis, or extreme edema in the lower extremities. Sensation is grossly intact to light touch bilaterally lower extremity. The patient has warm and well-perfused Bilateral lower extremities with brisk capillary refill. Inspection: Moderate swelling, no gross deformities, no signs of infection. Palpation: There is no patellofemoral crepitus, there is mild medial joint line tenderness    Range of Motion:  0-120    Strength:   Motor is grossly intact    Special Tests: Lachman's    Gait: Non antalgic    Alignment: Neutral    Radiology:     None taken

## 2018-11-28 DIAGNOSIS — S83.242A TEAR OF MEDIAL MENISCUS OF LEFT KNEE, UNSPECIFIED TEAR TYPE, UNSPECIFIED WHETHER OLD OR CURRENT TEAR, INITIAL ENCOUNTER: Primary | ICD-10-CM

## 2018-11-30 ENCOUNTER — TELEPHONE (OUTPATIENT)
Dept: ORTHOPEDIC SURGERY | Age: 53
End: 2018-11-30

## 2018-12-09 ENCOUNTER — ANESTHESIA EVENT (OUTPATIENT)
Dept: OPERATING ROOM | Age: 53
End: 2018-12-09
Payer: COMMERCIAL

## 2018-12-10 ENCOUNTER — HOSPITAL ENCOUNTER (OUTPATIENT)
Age: 53
Setting detail: OUTPATIENT SURGERY
Discharge: HOME | End: 2018-12-10
Attending: ORTHOPAEDIC SURGERY | Admitting: ORTHOPAEDIC SURGERY
Payer: COMMERCIAL

## 2018-12-10 ENCOUNTER — ANESTHESIA (OUTPATIENT)
Dept: OPERATING ROOM | Age: 53
End: 2018-12-10
Payer: COMMERCIAL

## 2018-12-10 VITALS
OXYGEN SATURATION: 98 % | HEART RATE: 55 BPM | SYSTOLIC BLOOD PRESSURE: 126 MMHG | RESPIRATION RATE: 16 BRPM | HEIGHT: 65 IN | WEIGHT: 145 LBS | DIASTOLIC BLOOD PRESSURE: 88 MMHG | BODY MASS INDEX: 24.16 KG/M2 | TEMPERATURE: 97.6 F

## 2018-12-10 VITALS — OXYGEN SATURATION: 97 % | DIASTOLIC BLOOD PRESSURE: 75 MMHG | SYSTOLIC BLOOD PRESSURE: 134 MMHG | TEMPERATURE: 96.1 F

## 2018-12-10 DIAGNOSIS — Z98.890 S/P LEFT KNEE ARTHROSCOPY: Primary | ICD-10-CM

## 2018-12-10 LAB — PREGNANCY, URINE: NEGATIVE

## 2018-12-10 PROCEDURE — S0028 INJECTION, FAMOTIDINE, 20 MG: HCPCS | Performed by: NURSE ANESTHETIST, CERTIFIED REGISTERED

## 2018-12-10 PROCEDURE — 6360000002 HC RX W HCPCS: Performed by: NURSE ANESTHETIST, CERTIFIED REGISTERED

## 2018-12-10 PROCEDURE — 7100000010 HC PHASE II RECOVERY - FIRST 15 MIN: Performed by: ORTHOPAEDIC SURGERY

## 2018-12-10 PROCEDURE — 7100000001 HC PACU RECOVERY - ADDTL 15 MIN: Performed by: ORTHOPAEDIC SURGERY

## 2018-12-10 PROCEDURE — 2720000010 HC SURG SUPPLY STERILE: Performed by: ORTHOPAEDIC SURGERY

## 2018-12-10 PROCEDURE — 2580000003 HC RX 258: Performed by: ANESTHESIOLOGY

## 2018-12-10 PROCEDURE — 3600000014 HC SURGERY LEVEL 4 ADDTL 15MIN: Performed by: ORTHOPAEDIC SURGERY

## 2018-12-10 PROCEDURE — 3700000001 HC ADD 15 MINUTES (ANESTHESIA): Performed by: ORTHOPAEDIC SURGERY

## 2018-12-10 PROCEDURE — 2500000003 HC RX 250 WO HCPCS: Performed by: NURSE ANESTHETIST, CERTIFIED REGISTERED

## 2018-12-10 PROCEDURE — 6360000002 HC RX W HCPCS: Performed by: ANESTHESIOLOGY

## 2018-12-10 PROCEDURE — 6360000002 HC RX W HCPCS: Performed by: ORTHOPAEDIC SURGERY

## 2018-12-10 PROCEDURE — 3600000004 HC SURGERY LEVEL 4 BASE: Performed by: ORTHOPAEDIC SURGERY

## 2018-12-10 PROCEDURE — 7100000011 HC PHASE II RECOVERY - ADDTL 15 MIN: Performed by: ORTHOPAEDIC SURGERY

## 2018-12-10 PROCEDURE — 2709999900 HC NON-CHARGEABLE SUPPLY: Performed by: ORTHOPAEDIC SURGERY

## 2018-12-10 PROCEDURE — 7100000000 HC PACU RECOVERY - FIRST 15 MIN: Performed by: ORTHOPAEDIC SURGERY

## 2018-12-10 PROCEDURE — 6370000000 HC RX 637 (ALT 250 FOR IP): Performed by: ORTHOPAEDIC SURGERY

## 2018-12-10 PROCEDURE — 3700000000 HC ANESTHESIA ATTENDED CARE: Performed by: ORTHOPAEDIC SURGERY

## 2018-12-10 PROCEDURE — 84703 CHORIONIC GONADOTROPIN ASSAY: CPT

## 2018-12-10 PROCEDURE — 2580000003 HC RX 258: Performed by: ORTHOPAEDIC SURGERY

## 2018-12-10 RX ORDER — ROPIVACAINE HYDROCHLORIDE 5 MG/ML
INJECTION, SOLUTION EPIDURAL; INFILTRATION; PERINEURAL PRN
Status: DISCONTINUED | OUTPATIENT
Start: 2018-12-10 | End: 2018-12-10 | Stop reason: HOSPADM

## 2018-12-10 RX ORDER — FENTANYL CITRATE 50 UG/ML
INJECTION, SOLUTION INTRAMUSCULAR; INTRAVENOUS PRN
Status: DISCONTINUED | OUTPATIENT
Start: 2018-12-10 | End: 2018-12-10 | Stop reason: SDUPTHER

## 2018-12-10 RX ORDER — ONDANSETRON 2 MG/ML
4 INJECTION INTRAMUSCULAR; INTRAVENOUS
Status: COMPLETED | OUTPATIENT
Start: 2018-12-10 | End: 2018-12-10

## 2018-12-10 RX ORDER — SODIUM CHLORIDE 0.9 % (FLUSH) 0.9 %
10 SYRINGE (ML) INJECTION EVERY 12 HOURS SCHEDULED
Status: DISCONTINUED | OUTPATIENT
Start: 2018-12-10 | End: 2018-12-10 | Stop reason: HOSPADM

## 2018-12-10 RX ORDER — PROPOFOL 10 MG/ML
INJECTION, EMULSION INTRAVENOUS PRN
Status: DISCONTINUED | OUTPATIENT
Start: 2018-12-10 | End: 2018-12-10 | Stop reason: SDUPTHER

## 2018-12-10 RX ORDER — CEFAZOLIN SODIUM 2 G/50ML
2 SOLUTION INTRAVENOUS ONCE
Status: COMPLETED | OUTPATIENT
Start: 2018-12-10 | End: 2018-12-10

## 2018-12-10 RX ORDER — SODIUM CHLORIDE, SODIUM LACTATE, POTASSIUM CHLORIDE, AND CALCIUM CHLORIDE .6; .31; .03; .02 G/100ML; G/100ML; G/100ML; G/100ML
IRRIGANT IRRIGATION PRN
Status: DISCONTINUED | OUTPATIENT
Start: 2018-12-10 | End: 2018-12-10 | Stop reason: HOSPADM

## 2018-12-10 RX ORDER — MIDAZOLAM HYDROCHLORIDE 1 MG/ML
INJECTION INTRAMUSCULAR; INTRAVENOUS PRN
Status: DISCONTINUED | OUTPATIENT
Start: 2018-12-10 | End: 2018-12-10 | Stop reason: SDUPTHER

## 2018-12-10 RX ORDER — DIPHENHYDRAMINE HYDROCHLORIDE 50 MG/ML
INJECTION INTRAMUSCULAR; INTRAVENOUS PRN
Status: DISCONTINUED | OUTPATIENT
Start: 2018-12-10 | End: 2018-12-10 | Stop reason: SDUPTHER

## 2018-12-10 RX ORDER — SODIUM CHLORIDE, SODIUM LACTATE, POTASSIUM CHLORIDE, CALCIUM CHLORIDE 600; 310; 30; 20 MG/100ML; MG/100ML; MG/100ML; MG/100ML
INJECTION, SOLUTION INTRAVENOUS CONTINUOUS
Status: DISCONTINUED | OUTPATIENT
Start: 2018-12-10 | End: 2018-12-10 | Stop reason: HOSPADM

## 2018-12-10 RX ORDER — FENTANYL CITRATE 50 UG/ML
50 INJECTION, SOLUTION INTRAMUSCULAR; INTRAVENOUS EVERY 5 MIN PRN
Status: DISCONTINUED | OUTPATIENT
Start: 2018-12-10 | End: 2018-12-10 | Stop reason: HOSPADM

## 2018-12-10 RX ORDER — DEXAMETHASONE SODIUM PHOSPHATE 4 MG/ML
INJECTION, SOLUTION INTRA-ARTICULAR; INTRALESIONAL; INTRAMUSCULAR; INTRAVENOUS; SOFT TISSUE PRN
Status: DISCONTINUED | OUTPATIENT
Start: 2018-12-10 | End: 2018-12-10 | Stop reason: SDUPTHER

## 2018-12-10 RX ORDER — OXYCODONE HYDROCHLORIDE AND ACETAMINOPHEN 5; 325 MG/1; MG/1
1-2 TABLET ORAL EVERY 4 HOURS PRN
Qty: 28 TABLET | Refills: 0 | Status: SHIPPED | OUTPATIENT
Start: 2018-12-10 | End: 2018-12-17

## 2018-12-10 RX ORDER — ONDANSETRON 2 MG/ML
INJECTION INTRAMUSCULAR; INTRAVENOUS PRN
Status: DISCONTINUED | OUTPATIENT
Start: 2018-12-10 | End: 2018-12-10 | Stop reason: SDUPTHER

## 2018-12-10 RX ORDER — GLYCOPYRROLATE 0.2 MG/ML
INJECTION INTRAMUSCULAR; INTRAVENOUS PRN
Status: DISCONTINUED | OUTPATIENT
Start: 2018-12-10 | End: 2018-12-10 | Stop reason: SDUPTHER

## 2018-12-10 RX ORDER — OXYCODONE HYDROCHLORIDE AND ACETAMINOPHEN 5; 325 MG/1; MG/1
2 TABLET ORAL ONCE
Status: COMPLETED | OUTPATIENT
Start: 2018-12-10 | End: 2018-12-10

## 2018-12-10 RX ORDER — LIDOCAINE HYDROCHLORIDE 20 MG/ML
INJECTION, SOLUTION INFILTRATION; PERINEURAL PRN
Status: DISCONTINUED | OUTPATIENT
Start: 2018-12-10 | End: 2018-12-10 | Stop reason: SDUPTHER

## 2018-12-10 RX ORDER — MEPERIDINE HYDROCHLORIDE 25 MG/ML
12.5 INJECTION INTRAMUSCULAR; INTRAVENOUS; SUBCUTANEOUS EVERY 5 MIN PRN
Status: DISCONTINUED | OUTPATIENT
Start: 2018-12-10 | End: 2018-12-10 | Stop reason: HOSPADM

## 2018-12-10 RX ORDER — MORPHINE SULFATE 4 MG/ML
2 INJECTION, SOLUTION INTRAMUSCULAR; INTRAVENOUS EVERY 5 MIN PRN
Status: DISCONTINUED | OUTPATIENT
Start: 2018-12-10 | End: 2018-12-10 | Stop reason: HOSPADM

## 2018-12-10 RX ORDER — SODIUM CHLORIDE 0.9 % (FLUSH) 0.9 %
10 SYRINGE (ML) INJECTION PRN
Status: DISCONTINUED | OUTPATIENT
Start: 2018-12-10 | End: 2018-12-10 | Stop reason: HOSPADM

## 2018-12-10 RX ORDER — ASPIRIN 81 MG/1
81 TABLET ORAL 2 TIMES DAILY WITH MEALS
Qty: 28 TABLET | Refills: 0 | Status: SHIPPED | OUTPATIENT
Start: 2018-12-10 | End: 2018-12-24

## 2018-12-10 RX ORDER — ROCURONIUM BROMIDE 10 MG/ML
INJECTION, SOLUTION INTRAVENOUS PRN
Status: DISCONTINUED | OUTPATIENT
Start: 2018-12-10 | End: 2018-12-10 | Stop reason: SDUPTHER

## 2018-12-10 RX ORDER — LABETALOL HYDROCHLORIDE 5 MG/ML
5 INJECTION, SOLUTION INTRAVENOUS EVERY 10 MIN PRN
Status: DISCONTINUED | OUTPATIENT
Start: 2018-12-10 | End: 2018-12-10 | Stop reason: HOSPADM

## 2018-12-10 RX ORDER — DOCUSATE SODIUM 100 MG/1
100 CAPSULE, LIQUID FILLED ORAL 2 TIMES DAILY
Qty: 20 CAPSULE | Refills: 0 | Status: SHIPPED | OUTPATIENT
Start: 2018-12-10

## 2018-12-10 RX ORDER — METOCLOPRAMIDE HYDROCHLORIDE 5 MG/ML
INJECTION INTRAMUSCULAR; INTRAVENOUS PRN
Status: DISCONTINUED | OUTPATIENT
Start: 2018-12-10 | End: 2018-12-10 | Stop reason: SDUPTHER

## 2018-12-10 RX ADMIN — ROCURONIUM BROMIDE 10 MG: 10 INJECTION, SOLUTION INTRAVENOUS at 09:30

## 2018-12-10 RX ADMIN — FENTANYL CITRATE 25 MCG: 50 INJECTION INTRAMUSCULAR; INTRAVENOUS at 10:36

## 2018-12-10 RX ADMIN — FAMOTIDINE 20 MG: 10 INJECTION, SOLUTION INTRAVENOUS at 09:46

## 2018-12-10 RX ADMIN — SODIUM CHLORIDE, SODIUM LACTATE, POTASSIUM CHLORIDE, AND CALCIUM CHLORIDE: 600; 310; 30; 20 INJECTION, SOLUTION INTRAVENOUS at 10:23

## 2018-12-10 RX ADMIN — METOCLOPRAMIDE 10 MG: 5 INJECTION, SOLUTION INTRAMUSCULAR; INTRAVENOUS at 09:46

## 2018-12-10 RX ADMIN — PROPOFOL 30 MG: 10 INJECTION, EMULSION INTRAVENOUS at 10:33

## 2018-12-10 RX ADMIN — ONDANSETRON 4 MG: 2 INJECTION INTRAMUSCULAR; INTRAVENOUS at 11:25

## 2018-12-10 RX ADMIN — LIDOCAINE HYDROCHLORIDE 100 MG: 20 INJECTION, SOLUTION INFILTRATION; PERINEURAL at 09:30

## 2018-12-10 RX ADMIN — PROPOFOL 150 MG: 10 INJECTION, EMULSION INTRAVENOUS at 09:30

## 2018-12-10 RX ADMIN — FENTANYL CITRATE 25 MCG: 50 INJECTION INTRAMUSCULAR; INTRAVENOUS at 10:34

## 2018-12-10 RX ADMIN — SUGAMMADEX 200 MG: 100 INJECTION, SOLUTION INTRAVENOUS at 10:19

## 2018-12-10 RX ADMIN — MIDAZOLAM HYDROCHLORIDE 2 MG: 1 INJECTION INTRAMUSCULAR; INTRAVENOUS at 09:21

## 2018-12-10 RX ADMIN — ONDANSETRON 4 MG: 2 INJECTION INTRAMUSCULAR; INTRAVENOUS at 10:18

## 2018-12-10 RX ADMIN — SODIUM CHLORIDE, SODIUM LACTATE, POTASSIUM CHLORIDE, AND CALCIUM CHLORIDE: 600; 310; 30; 20 INJECTION, SOLUTION INTRAVENOUS at 07:45

## 2018-12-10 RX ADMIN — FENTANYL CITRATE 50 MCG: 50 INJECTION INTRAMUSCULAR; INTRAVENOUS at 09:55

## 2018-12-10 RX ADMIN — DEXAMETHASONE SODIUM PHOSPHATE 8 MG: 4 INJECTION, SOLUTION INTRAMUSCULAR; INTRAVENOUS at 09:40

## 2018-12-10 RX ADMIN — OXYCODONE AND ACETAMINOPHEN 2 TABLET: 5; 325 TABLET ORAL at 11:46

## 2018-12-10 RX ADMIN — FENTANYL CITRATE 100 MCG: 50 INJECTION INTRAMUSCULAR; INTRAVENOUS at 09:30

## 2018-12-10 RX ADMIN — GLYCOPYRROLATE 0.1 MG: 0.2 INJECTION INTRAMUSCULAR; INTRAVENOUS at 09:47

## 2018-12-10 RX ADMIN — PROPOFOL 20 MG: 10 INJECTION, EMULSION INTRAVENOUS at 10:36

## 2018-12-10 RX ADMIN — GLYCOPYRROLATE 0.2 MG: 0.2 INJECTION INTRAMUSCULAR; INTRAVENOUS at 09:21

## 2018-12-10 RX ADMIN — CEFAZOLIN SODIUM 2 G: 2 SOLUTION INTRAVENOUS at 09:35

## 2018-12-10 RX ADMIN — ROCURONIUM BROMIDE 40 MG: 10 INJECTION, SOLUTION INTRAVENOUS at 09:31

## 2018-12-10 RX ADMIN — DIPHENHYDRAMINE HYDROCHLORIDE 25 MG: 50 INJECTION, SOLUTION INTRAMUSCULAR; INTRAVENOUS at 09:44

## 2018-12-10 ASSESSMENT — PULMONARY FUNCTION TESTS
PIF_VALUE: 20
PIF_VALUE: 18
PIF_VALUE: 20
PIF_VALUE: 20
PIF_VALUE: 19
PIF_VALUE: 20
PIF_VALUE: 19
PIF_VALUE: 20
PIF_VALUE: 3
PIF_VALUE: 4
PIF_VALUE: 0
PIF_VALUE: 19
PIF_VALUE: 0
PIF_VALUE: 0
PIF_VALUE: 11
PIF_VALUE: 20
PIF_VALUE: 11
PIF_VALUE: 1
PIF_VALUE: 20
PIF_VALUE: 20
PIF_VALUE: 3
PIF_VALUE: 19
PIF_VALUE: 18
PIF_VALUE: 4
PIF_VALUE: 20
PIF_VALUE: 20
PIF_VALUE: 3
PIF_VALUE: 1
PIF_VALUE: 20
PIF_VALUE: 20
PIF_VALUE: 6
PIF_VALUE: 13
PIF_VALUE: 20
PIF_VALUE: 19
PIF_VALUE: 14
PIF_VALUE: 21
PIF_VALUE: 20
PIF_VALUE: 1
PIF_VALUE: 20
PIF_VALUE: 20
PIF_VALUE: 19
PIF_VALUE: 19
PIF_VALUE: 0
PIF_VALUE: 20
PIF_VALUE: 15
PIF_VALUE: 20
PIF_VALUE: 15
PIF_VALUE: 18
PIF_VALUE: 20
PIF_VALUE: 1
PIF_VALUE: 0
PIF_VALUE: 21
PIF_VALUE: 3
PIF_VALUE: 20
PIF_VALUE: 15
PIF_VALUE: 14
PIF_VALUE: 0
PIF_VALUE: 19
PIF_VALUE: 20
PIF_VALUE: 19
PIF_VALUE: 0
PIF_VALUE: 18
PIF_VALUE: 19
PIF_VALUE: 11
PIF_VALUE: 20
PIF_VALUE: 0
PIF_VALUE: 19
PIF_VALUE: 20
PIF_VALUE: 0
PIF_VALUE: 19
PIF_VALUE: 19
PIF_VALUE: 18
PIF_VALUE: 2
PIF_VALUE: 20
PIF_VALUE: 19
PIF_VALUE: 20
PIF_VALUE: 20
PIF_VALUE: 18
PIF_VALUE: 3
PIF_VALUE: 20
PIF_VALUE: 20

## 2018-12-10 ASSESSMENT — PAIN SCALES - GENERAL
PAINLEVEL_OUTOF10: 4

## 2018-12-10 ASSESSMENT — PAIN DESCRIPTION - PAIN TYPE
TYPE: SURGICAL PAIN

## 2018-12-10 ASSESSMENT — PAIN DESCRIPTION - ORIENTATION
ORIENTATION: LEFT

## 2018-12-10 ASSESSMENT — PAIN DESCRIPTION - LOCATION
LOCATION: KNEE

## 2018-12-10 ASSESSMENT — PAIN - FUNCTIONAL ASSESSMENT: PAIN_FUNCTIONAL_ASSESSMENT: 0-10

## 2018-12-10 ASSESSMENT — PAIN DESCRIPTION - FREQUENCY
FREQUENCY: CONTINUOUS

## 2018-12-10 ASSESSMENT — PAIN DESCRIPTION - DESCRIPTORS
DESCRIPTORS: SORE
DESCRIPTORS: THROBBING
DESCRIPTORS: SORE
DESCRIPTORS: SORE

## 2018-12-10 NOTE — ANESTHESIA POSTPROCEDURE EVALUATION
Department of Anesthesiology  Postprocedure Note    Patient: Wolf Lowry  MRN: [id number]  YOB: 1965  Date of evaluation: 12/10/2018  Time:  12:18 PM     Procedure Summary     Date:  12/10/18 Room / Location:  [facility] OR [address] [place] [address] OR    Anesthesia Start:  4678 Anesthesia Stop:  0140    Procedure:  LEFT KNEE ARTHROSCOPY, PARTIAL MEDIAL MENISCECTOMY, REMOVAL MULTIPLE LOOSE BODIES. (Left Knee) Diagnosis:  (MEDIAL MENISCUS TEAR LEFT KNEE)    Surgeon:  Benedicto Whitney MD Responsible Provider:  Tamara Wolf MD    Anesthesia Type:  Not recorded ASA Status:  2          Anesthesia Type: No value filed. Sarah Phase I: Sarah Score: 10    Sarah Phase II:      Last vitals: Reviewed and per EMR flowsheets.        Anesthesia Post Evaluation    Patient location during evaluation: PACU  Patient participation: complete - patient participated  Level of consciousness: awake and alert  Airway patency: patent  Nausea & Vomiting: no vomiting and no nausea  Complications: no  Cardiovascular status: hemodynamically stable  Respiratory status: acceptable  Hydration status: euvolemic

## 2018-12-10 NOTE — PROGRESS NOTES
Patient arrived from OR to PACU # 10 s/p LEFT KNEE ARTHROSCOPY, PARTIAL MEDIAL MENISCECTOMY, REMOVAL MULTIPLE LOOSE BODIES. (Left Knee) per . Attached to PACU monitoring device, report received from CRNA who reported patient to be hemodynamically stable during procedure.  VSS
hospitalization, the order may or may not be in effect during this procedure. I give my doctor permission to give me blood or blood products. I understand that there are risks with receiving blood such as hepatitis, AIDS, fever, or allergic reaction. I acknowledge that the risks, benefits, and alternatives of this treatment have been explained to me and that no express or implied warranty has been given by the hospital, any blood bank, or any person or entity as to the blood or blood components transfused. At the discretion of my doctor, I agree to allow observers, equipment/product representatives and allow photographing, and/or televising of the procedure, provided my name or identity is maintained confidentially. I agree the hospital may dispose of or use for scientific or educational purposes any tissue, fluid, or body parts which may be removed.     ________________________________Date________Time______ am/pm  ([place] [facility])  Patient or Signature of Closest Relative or Legal Guardian    ________________________________Date________Time______am/pm      Page 1 of  1  Witness
option #2 if you have not been preregistered yet. On the day of your procedure bring your insurance card and photo ID. You will be registered at your bedside once brought back to your room. 5. DO NOT EAT ANYTHING eight hours prior to surgery. May have 8 ounces of water 4 hours prior to surgery. 6. MEDICATIONS    Take the following medications with a SMALL sip of water:NONE Use your usual dose of inhalers the morning of surgery. BRING your rescue inhaler with you to hospital.    Anesthesia does NOT want you to take insulin the morning of surgery. They will control your blood sugar while you are at the hospital. Please contact your ordering physician for instructions regarding your insulin the night before your procedure. If you have an insulin pump, please keep it set on basal rate. 7. Do not swallow water when brushing teeth. No gum, candy, mints or ice chips. Refrain from smoking or at least decrease the amount. 8. Dress in loose, comfortable clothing appropriate for redressing after your procedure. Do not wear jewelry (including body piercings), make-up (especially NO eye make-up), fingernail polish (NO toenail polish if foot/leg surgery), lotion, powders or metal hairclips. 9. Dentures, glasses, or contacts will need to be removed before your procedure. Bring cases for your glasses, contacts, dentures, or hearing aids to protect them while you are in surgery. 10. If you use a CPAP, please bring it with you on the day of your procedure. 11. We recommend that valuable personal  belongings, such as credit cards, cash, cell phones, e-tablets or jewelry, be left at home during your stay. The hospital will not be responsible for valuables that are not secured in the hospital safe. However, if your insurance requires a co-pay, you may want to bring a method of payment, i.e. Check or credit card, if you wish to pay your co-pay the day of surgery.       12. If you are to stay overnight, you

## 2018-12-10 NOTE — OP NOTE
[address] [facility]               [address]                                OPERATIVE REPORT    PATIENT NAME: Hannah Rodriges                   :        1965  MED REC NO:   [id number]                          ROOM:  ACCOUNT NO:   [de-identified]                           ADMIT DATE: 12/10/2018  PROVIDER:     Ephraim Joshua MD    DATE OF PROCEDURE:  12/10/2018    PREOPERATIVE DIAGNOSIS:  Degenerative medial meniscus tear left knee,  status post prior surgical repair. POSTOPERATIVE DIAGNOSES:  Degenerative medial meniscus tear left knee,  status post prior surgical repair with multiple small loose bodies  indicative of early medial tibiofemoral compartment deterioration. OPERATIVE PROCEDURE:  1. Arthroscopically assisted partial medial meniscectomy degenerative  medial meniscus tear, posterior horn, left knee. 2.  Arthroscopic removal of multiple small fragmented loose bodies, left  knee. SURGEON:  Ephraim Joshua MD.    FIRST ASSISTANT:  Dr. Delia Knight. ANESTHESIA:  General.    OPERATIVE INDICATIONS:  This patient has advancing symptoms affecting  all activities of daily living that are mechanical in nature and  understood the associated risks and benefits and consented to the  operative procedure. MRI did confirm residual damage to the posterior  medial meniscus. OPERATIVE FINDINGS:  1. Complex degenerative tear including radial and flap tears of the  posterior horn of the medial meniscus not repairable with a simple  excision performed saving the anterior and mid portions of the meniscus. 2.  Early 2B articular cartilage deterioration of the tibial and femoral  surfaces. She may have advancing medial compartment symptoms over the  future years. 3.  Small area of articular cartilage to the central damage outer  surface patella, 12 to 15 mm in diameter.   This would not contraindicate  a future unicompartmental medial the  patient returned to recovery unit in excellent condition.         Rodney Bernabe MD    D: 12/10/2018 10:36:50       T: 12/10/2018 13:01:02     [email or web address]  Job#: [id number]     Doc#: [id number]    CC:

## 2018-12-10 NOTE — H&P
I have reviewed the history and physical and examined the patient and find no relevant changes. I have reviewed with the patient and/or family the risks, benefits, and alternatives to the procedure.     Nelly Morleos,   12/10/2018

## 2018-12-11 ENCOUNTER — HOSPITAL ENCOUNTER (OUTPATIENT)
Dept: PHYSICAL THERAPY | Age: 53
Setting detail: THERAPIES SERIES
Discharge: HOME | End: 2018-12-11
Payer: COMMERCIAL

## 2018-12-11 PROCEDURE — 97110 THERAPEUTIC EXERCISES: CPT | Performed by: PHYSICAL THERAPIST

## 2018-12-11 PROCEDURE — G8979 MOBILITY GOAL STATUS: HCPCS | Performed by: PHYSICAL THERAPIST

## 2018-12-11 PROCEDURE — 97016 VASOPNEUMATIC DEVICE THERAPY: CPT | Performed by: PHYSICAL THERAPIST

## 2018-12-11 PROCEDURE — G0283 ELEC STIM OTHER THAN WOUND: HCPCS | Performed by: PHYSICAL THERAPIST

## 2018-12-11 PROCEDURE — 97530 THERAPEUTIC ACTIVITIES: CPT | Performed by: PHYSICAL THERAPIST

## 2018-12-11 PROCEDURE — G8978 MOBILITY CURRENT STATUS: HCPCS | Performed by: PHYSICAL THERAPIST

## 2018-12-11 PROCEDURE — 97162 PT EVAL MOD COMPLEX 30 MIN: CPT | Performed by: PHYSICAL THERAPIST

## 2018-12-11 NOTE — FLOWSHEET NOTE
[address] [facility], [place] [place]    Physical Therapy Daily Treatment Note  Date:  2018    Patient Name:  Eusebia Tran    :  1965  MRN: [id number]  Restrictions/Precautions:    Medical/Treatment Diagnosis Information:  Diagnosis: T91.179B (ICD-10-CM) - Acute medial meniscus tear of left knee           Treatment Diagnosis: Knee pain/ swelling/ difficulty walking/ limited ROM/ LE muscle weakness    S/P L knee Ascope for PMME and joint debridement  DOS: 12/10/18  Current PO meds: Norco; ASA       Insurance/Certification information:   [employer] NATIONAL POS/ OPEN ACCESS  Physician Information:  Jose Childs MD  Plan of care signed (Y/N): Pending    Date of Patient follow up with Physician:   Patient seen in consultation with Dr. Fe Greer who established the initial/subsequent treatment protocol. 18: should feel better, thinks that meniscus never really healed previous repair    G-Code (if applicable):      Date G-Code Applied:  18  PT G-Codes  Functional Assessment Tool Used: LEFS  Score: 0/ 80= 0%  Functional Limitation: Mobility: Walking and moving around  Mobility: Walking and Moving Around Current Status (): 100 percent impaired, limited or restricted  Mobility: Walking and Moving Around Goal Status (): At least 1 percent but less than 20 percent impaired, limited or restricted    Progress Note: [x]  Yes  []  No  Next due by: Visit #10       Latex Allergy:  [x]NO      []YES  Preferred Language for Healthcare:   [x]English       []other:    Visit # Insurance Allowable   1 20     Pain Scale: 5-6/ 10 @ rest     6-8//10 @ worst  Easing factors: Rest/ ice/ meds  Provocative factors: WB activity; stairs; standing; walking; kneeling; squatting   Type: [x]Constant           []Intermittent      []Radiating         []Localized         []other:     SUBJECTIVE: Presents for initial PO PT visit.   Arthroscopic partial meniscectomy due to persistent MJL facilitate improvement in movement, function, and ADLs as indicated by Functional Deficits.     Long Term Goals: To be achieved in: 12 weeks PO  1. Disability index score of 40% or less for the LEFS to assist with reaching prior level of function. 2. Patient will demonstrate increased AROM to 0-140 deg to allow for proper joint functioning as indicated by patients Functional Deficits. 3. Patient will demonstrate an increase in Strength to good proximal hip strength and control in LE to allow for proper functional mobility as indicated by patients Functional Deficits/ Biodex test score/ parameters including, but not limited to bilateral/ TQBW/ HQ ratios corrected for age/ sex/ BW.   4. Patient will return to 60%> functional activities without increased symptoms or restriction. 5. Patient will begin a physical fitness program with OA and meniscus precautions using BW/ AW/ EB/ machines as tolerated.                 Progression Towards Functional goals:  [] Patient is progressing as expected towards functional goals listed. [] Progression is slowed due to complexities listed. [] Progression has been slowed due to co-morbidities.   [x] Plan just implemented, too soon to assess goals progression  [] Other:     ASSESSMENT:  Functional Impairments:                []Noted lumbar/proximal hip/LE hypomobility              [x]Decreased LE functional ROM              [x]Decreased core/proximal hip strength and neuromuscular control              [x]Decreased LE functional strength   [x]Reduced balance/proprioceptive control              []other:       Functional Activity Limitations (from functional questionnaire and intake)              [x]Reduced ability to tolerate prolonged functional positions              [x]Reduced ability or difficulty with changes of positions or transfers between positions              [x]Reduced ability to maintain good posture and demonstrate good body mechanics with sitting, bending, and lifting              [x]Reduced ability to sleep              [x] Reduced ability or tolerance with driving and/or computer work              [x]Reduced ability to perform lifting, carrying tasks              [x]Reduced ability to squat              [x]Reduced ability to forward bend              [x]Reduced ability to ambulate prolonged functional periods/distances/surfaces              [x]Reduced ability to ascend/descend stairs              [x]Reduced ability to run, hop or jump              []other:     Participation Restrictions              [x]Reduced participation in self care activities              [x]Reduced participation in home management activities              [x]Reduced participation in work activities              [x]Reduced participation in social activities. [x]Reduced participation in sport activities.     Classification :               [x]Signs/symptoms consistent with post-surgical status including decreased ROM, strength and function.               []Signs/symptoms consistent with joint sprain/strain              [x]Signs/symptoms consistent with patella-femoral syndrome              [x]Signs/symptoms consistent with knee OA/hip OA              [x]Signs/symptoms consistent with internal derangement of knee/Hip              [x]Signs/symptoms consistent with functional hip weakness/NMR control                     []Signs/symptoms consistent with tendinitis/tendinosis                         []signs/symptoms consistent with pathology which may benefit from Dry needling                  []other:       Prognosis/Rehab Potential:                                       []Excellent              [x]Good                 [x]Fair              []Poor     Tolerance of evaluation/treatment:              [] Patient tolerated treatment well [] Patient limited by fatique  [x] Patient limited by pain  [] Patient limited by other medical complications  [x] Other:  Limited functional ADL due to pain/

## 2018-12-11 NOTE — PLAN OF CARE
[facility] [address] 56                                                       Physical Therapy Certification    Dear Dusty Barbosa MD,    We had the pleasure of evaluating the following patient for physical therapy services at [address] [place] [address]. A summary of our findings can be found in the initial assessment below. This includes our plan of care. If you have any questions or concerns regarding these findings, please do not hesitate to contact me at the office phone number checked above. Thank you for the referral.       Physician Signature:_______________________________Date:__________________  By signing above (or electronic signature), therapists plan is approved by physician      Patient: Marquis Pa   : 1965   MRN: [id number]  Referring Physician: Referring Practitioner: Dusty Barbosa MD      Evaluation Date: 2018      Medical Diagnosis Information:  Diagnosis: G36.443L (ICD-10-CM) - Acute medial meniscus tear of left knee   Treatment Diagnosis: Knee pain/ swelling/ difficulty walking/ limited ROM/ LE muscle weakness    S/P L knee Ascope for PMME and joint debridement  DOS: 12/10/18  Current PO meds: Norco; ASA                                       Insurance information:  [employer] POS/ OPEN ACCESS     Precautions/ Contra-indications: OA and meniscus precautions for long term joint health  Latex Allergy:  [x]NO      []YES  Preferred Language for Healthcare:   [x]English       []other:    SUBJECTIVE: Presents for initial PO PT visit. Arthroscopic partial meniscectomy due to persistent MJL pain/ tenderness/ joint catching/ limited function. Denies nausea/ vomiting/ elevated temperature/ calf pain. Using bilateral axillary crutches TTWB/ PO bandage and dressing intact.   Anxious to progress program.  Feels knee (meniscus) did not fully heal from her ACL/ MMR in

## 2018-12-17 ENCOUNTER — HOSPITAL ENCOUNTER (OUTPATIENT)
Dept: PHYSICAL THERAPY | Age: 53
Setting detail: THERAPIES SERIES
Discharge: HOME | End: 2018-12-17
Payer: COMMERCIAL

## 2018-12-17 PROCEDURE — 97110 THERAPEUTIC EXERCISES: CPT | Performed by: PHYSICAL THERAPIST

## 2018-12-17 PROCEDURE — 97530 THERAPEUTIC ACTIVITIES: CPT | Performed by: PHYSICAL THERAPIST

## 2018-12-17 PROCEDURE — 97016 VASOPNEUMATIC DEVICE THERAPY: CPT | Performed by: PHYSICAL THERAPIST

## 2018-12-17 PROCEDURE — 97140 MANUAL THERAPY 1/> REGIONS: CPT | Performed by: PHYSICAL THERAPIST

## 2018-12-17 PROCEDURE — G0283 ELEC STIM OTHER THAN WOUND: HCPCS | Performed by: PHYSICAL THERAPIST

## 2018-12-17 NOTE — FLOWSHEET NOTE
functional ADL due to pain/ swelling/ difficulty walking/ limited ROM/ LE muscle weakness; gradually improving quad control; improving ROM; advancing WBAT, but still with antalgic gait.     Prognosis: [] Good [] Fair  [] Poor    Patient Requires Follow-up: [x] Yes  [] No    PLAN:   [] Continue per plan of care [] Alter current plan (see comments)  [x] Plan of care initiated [] Hold pending MD visit [] Discharge  Frequency/Duration:  2 days per week for 12 Weeks:    Bandage and dressing check  Initial PO protocol for PMME and OA precautions    Electronically signed by: Mge Starks PT

## 2018-12-18 DIAGNOSIS — Z98.890 S/P LEFT KNEE ARTHROSCOPY: Primary | ICD-10-CM

## 2018-12-18 RX ORDER — HYDROCODONE BITARTRATE AND ACETAMINOPHEN 5; 325 MG/1; MG/1
1 TABLET ORAL EVERY 6 HOURS PRN
Qty: 20 TABLET | Refills: 0 | Status: SHIPPED | OUTPATIENT
Start: 2018-12-18 | End: 2018-12-23

## 2018-12-19 ENCOUNTER — HOSPITAL ENCOUNTER (OUTPATIENT)
Dept: PHYSICAL THERAPY | Age: 53
Setting detail: THERAPIES SERIES
Discharge: HOME | End: 2018-12-19
Payer: COMMERCIAL

## 2018-12-19 PROCEDURE — 97110 THERAPEUTIC EXERCISES: CPT | Performed by: PHYSICAL THERAPIST

## 2018-12-19 PROCEDURE — G0283 ELEC STIM OTHER THAN WOUND: HCPCS | Performed by: PHYSICAL THERAPIST

## 2018-12-19 PROCEDURE — 97016 VASOPNEUMATIC DEVICE THERAPY: CPT | Performed by: PHYSICAL THERAPIST

## 2018-12-19 PROCEDURE — 97530 THERAPEUTIC ACTIVITIES: CPT | Performed by: PHYSICAL THERAPIST

## 2018-12-19 PROCEDURE — 97140 MANUAL THERAPY 1/> REGIONS: CPT | Performed by: PHYSICAL THERAPIST

## 2018-12-19 NOTE — FLOWSHEET NOTE
Arthroscopic partial meniscectomy due to persistent MJL pain/ tenderness/ joint catching/ limited function. Denies nausea/ vomiting/ elevated temperature/ calf pain. Using bilateral axillary crutches PWB/ ACE bandage and dressing intact. Anxious to progress program.  Feels knee (meniscus) did not fully heal from her ACL/ MMR in 2017.  (+) giving way/ HE with FWB  (+) night pain  (+) popping/ isolated instance; bent to straight  (-)catching/locking  (+) swelling  (+) stiffness  (+) stairs/ limited use; one at a time; throbbing   (NO) kneeling/squatting      OBJECTIVE:       LEFS Score: 0/ 80= 0% (12/11/18;  Postop)     12-19-18            ROM PROM AROM Overpressure Comment     L R L R L R     Flexion 142 135          ERMI   Extension 0 0                                                    12-19-18  Strength L R Comment   Quad 4/5  Slow; deliberate; pain 5/5 ALICIA 0°   Hamstring         Gastroc         Hip  flexion         Hip abd                                12-19-18  Special Test Results/Comment   Meniscal Click     Crepitus     Drop Back     Homans (-)   Temperature (-)      12-19-18  Girth L R   Mid Patella 34.5 35.5   Suprapatellar 35.8 36.0   5cm above 37.5 38.1   15cm above           Reflexes/Sensation:               []Dermatomes/Myotomes intact               []Reflexes equal and normal bilaterally              [x]Other: NT     Joint mobility:              [x]Normal               []Hypo              []Hyper     Palpation: Generalized knee due to PO pain and swelling; supralateral portal due to swelling     Functional Mobility/Transfers: Independent     Posture: General genu valgum     Bandages/Dressings/Incisions: AC wrap and TEDS ; steri strips intact; incisions dry/ clean/ (-) redness     Gait: Presents using bilateral axillary crutches; % PWBAT; precautions with advancing to FWB     Orthopedic Special Tests:         RESTRICTIONS/PRECAUTIONS: Meniscus and OA precautions    Exercises/Interventions: Activity Limitations (from functional questionnaire and intake)              [x]Reduced ability to tolerate prolonged functional positions              [x]Reduced ability or difficulty with changes of positions or transfers between positions              [x]Reduced ability to maintain good posture and demonstrate good body mechanics with sitting, bending, and lifting              [x]Reduced ability to sleep              [x] Reduced ability or tolerance with driving and/or computer work              [x]Reduced ability to perform lifting, carrying tasks              [x]Reduced ability to squat              [x]Reduced ability to forward bend              [x]Reduced ability to ambulate prolonged functional periods/distances/surfaces              [x]Reduced ability to ascend/descend stairs              [x]Reduced ability to run, hop or jump              []other:     Participation Restrictions              [x]Reduced participation in self care activities              [x]Reduced participation in home management activities              [x]Reduced participation in work activities              [x]Reduced participation in social activities. [x]Reduced participation in sport activities.     Classification :               [x]Signs/symptoms consistent with post-surgical status including decreased ROM, strength and function.               []Signs/symptoms consistent with joint sprain/strain              [x]Signs/symptoms consistent with patella-femoral syndrome              [x]Signs/symptoms consistent with knee OA/hip OA              [x]Signs/symptoms consistent with internal derangement of knee/Hip              [x]Signs/symptoms consistent with functional hip weakness/NMR control                     []Signs/symptoms consistent with tendinitis/tendinosis                         []signs/symptoms consistent with pathology which may benefit from Dry needling                  []other:       Prognosis/Rehab Potential: []Excellent              [x]Good                 [x]Fair              []Poor     Tolerance of evaluation/treatment:              [] Patient tolerated treatment well [] Patient limited by fatique  [x] Patient limited by pain  [] Patient limited by other medical complications  [x] Other:  Limited functional ADL due to pain/ swelling/ difficulty walking/ limited ROM/ LE muscle weakness; gradually improving quad control; improving ROM; advancing FWBAT, but still with mild antalgic gait.     Prognosis: [] Good [] Fair  [] Poor    Patient Requires Follow-up: [x] Yes  [] No    PLAN:   [] Continue per plan of care [] Alter current plan (see comments)  [x] Plan of care initiated [] Hold pending MD visit [] Discharge  Frequency/Duration:  2 days per week for 12 Weeks:    Bandage and dressing check  Initial PO protocol for PMME and OA precautions    Electronically signed by: Balbir Mallory PT

## 2018-12-26 ENCOUNTER — HOSPITAL ENCOUNTER (OUTPATIENT)
Dept: PHYSICAL THERAPY | Age: 53
Setting detail: THERAPIES SERIES
Discharge: HOME | End: 2018-12-26
Payer: COMMERCIAL

## 2019-01-03 ENCOUNTER — HOSPITAL ENCOUNTER (OUTPATIENT)
Dept: PHYSICAL THERAPY | Age: 54
Setting detail: THERAPIES SERIES
Discharge: HOME | End: 2019-01-03
Payer: COMMERCIAL

## 2019-01-03 PROCEDURE — 97530 THERAPEUTIC ACTIVITIES: CPT | Performed by: PHYSICAL THERAPY ASSISTANT

## 2019-01-03 PROCEDURE — 97110 THERAPEUTIC EXERCISES: CPT | Performed by: PHYSICAL THERAPY ASSISTANT

## 2019-01-03 PROCEDURE — 97112 NEUROMUSCULAR REEDUCATION: CPT | Performed by: PHYSICAL THERAPY ASSISTANT

## 2019-01-03 PROCEDURE — 97016 VASOPNEUMATIC DEVICE THERAPY: CPT | Performed by: PHYSICAL THERAPY ASSISTANT

## 2019-01-08 ENCOUNTER — HOSPITAL ENCOUNTER (OUTPATIENT)
Dept: PHYSICAL THERAPY | Age: 54
Setting detail: THERAPIES SERIES
Discharge: HOME | End: 2019-01-08
Payer: COMMERCIAL

## 2019-01-08 ENCOUNTER — OFFICE VISIT (OUTPATIENT)
Dept: ORTHOPEDIC SURGERY | Age: 54
End: 2019-01-08

## 2019-01-08 VITALS
WEIGHT: 145 LBS | SYSTOLIC BLOOD PRESSURE: 100 MMHG | BODY MASS INDEX: 24.75 KG/M2 | HEART RATE: 63 BPM | HEIGHT: 64 IN | DIASTOLIC BLOOD PRESSURE: 70 MMHG

## 2019-01-08 DIAGNOSIS — Z98.890 STATUS POST LEFT KNEE SURGERY: Primary | ICD-10-CM

## 2019-01-08 PROCEDURE — G8978 MOBILITY CURRENT STATUS: HCPCS | Performed by: PHYSICAL THERAPY ASSISTANT

## 2019-01-08 PROCEDURE — 99024 POSTOP FOLLOW-UP VISIT: CPT | Performed by: ORTHOPAEDIC SURGERY

## 2019-01-08 PROCEDURE — 97530 THERAPEUTIC ACTIVITIES: CPT | Performed by: PHYSICAL THERAPIST

## 2019-01-08 PROCEDURE — G8979 MOBILITY GOAL STATUS: HCPCS | Performed by: PHYSICAL THERAPY ASSISTANT

## 2019-01-08 PROCEDURE — 97112 NEUROMUSCULAR REEDUCATION: CPT | Performed by: PHYSICAL THERAPIST

## 2019-01-08 PROCEDURE — 97110 THERAPEUTIC EXERCISES: CPT | Performed by: PHYSICAL THERAPIST

## (undated) DEVICE — SURE SET-DOUBLE BASIN-LF: Brand: MEDLINE INDUSTRIES, INC.

## (undated) DEVICE — PACK PROCEDURE SURG ARTHROSCOPY

## (undated) DEVICE — CHLORAPREP 26ML ORANGE

## (undated) DEVICE — GOWN,SIRUS,POLYRNF,SETINSLV,L,20/CS: Brand: MEDLINE

## (undated) DEVICE — TUBING FLD MGMT Y DBL SPIK DUALWAVE

## (undated) DEVICE — COVER,MAYO STAND,XL,STERILE: Brand: MEDLINE

## (undated) DEVICE — COVER LT HNDL BLU PLAS

## (undated) DEVICE — GOWN,SIRUS,POLYRNF,BRTHSLV,XLN/XXL,18/CS: Brand: MEDLINE

## (undated) DEVICE — SUTURE VCRL SZ 0 L18IN ABSRB UD L36MM CT-1 1/2 CIR J840D

## (undated) DEVICE — BLADE SHV L13CM DIA4MM EXCALIBUR AGG COOLCUT

## (undated) DEVICE — 3M™ IOBAN™ 2 ANTIMICROBIAL INCISE DRAPE 6648EZ: Brand: IOBAN™ 2

## (undated) DEVICE — ELECTRODE PT RET AD L9FT HI MOIST COND ADH HYDRGEL CORDED

## (undated) DEVICE — BLADE SHV L13CM DIA4MM TAPR TIP SCIS LIKE CUT OVL OUTER

## (undated) DEVICE — 87K ARTHROSCOPY TUBING SET: Brand: 87K

## (undated) DEVICE — STRIP,CLOSURE,WOUND,MEDI-STRIP,1/2X4: Brand: MEDLINE

## (undated) DEVICE — STERILE POLYISOPRENE POWDER-FREE SURGICAL GLOVES WITH EMOLLIENT COATING: Brand: PROTEXIS

## (undated) DEVICE — GLOVE SURG SZ 9 L12IN FNGR THK75MIL WHT LTX POLYMER BEAD

## (undated) DEVICE — SURGICAL SET UP - SURE SET: Brand: MEDLINE INDUSTRIES, INC.

## (undated) DEVICE — SOLUTION IRRIG 3000ML LAC R FLX CONT

## (undated) DEVICE — SOLUTION IV 1000ML 0.9% SOD CHL

## (undated) DEVICE — SHEET,DRAPE,53X77,STERILE: Brand: MEDLINE

## (undated) DEVICE — SUTURE VCRL UD BR CT 3-0 18IN CT1 J838D

## (undated) DEVICE — GOWN,SIRUS,POLYRNF,SETINSLV,XL,20/CS: Brand: MEDLINE

## (undated) DEVICE — TURNOVER KIT RM INF CTRL TECH